# Patient Record
Sex: FEMALE | Race: BLACK OR AFRICAN AMERICAN | Employment: FULL TIME | URBAN - METROPOLITAN AREA
[De-identification: names, ages, dates, MRNs, and addresses within clinical notes are randomized per-mention and may not be internally consistent; named-entity substitution may affect disease eponyms.]

---

## 2018-09-23 ENCOUNTER — APPOINTMENT (OUTPATIENT)
Dept: GENERAL RADIOLOGY | Age: 43
End: 2018-09-23
Attending: EMERGENCY MEDICINE
Payer: SELF-PAY

## 2018-09-23 ENCOUNTER — HOSPITAL ENCOUNTER (EMERGENCY)
Age: 43
Discharge: HOME | End: 2018-09-23
Attending: EMERGENCY MEDICINE
Payer: SELF-PAY

## 2018-09-23 VITALS
WEIGHT: 213 LBS | OXYGEN SATURATION: 98 % | RESPIRATION RATE: 23 BRPM | HEART RATE: 80 BPM | BODY MASS INDEX: 35.45 KG/M2 | SYSTOLIC BLOOD PRESSURE: 125 MMHG | DIASTOLIC BLOOD PRESSURE: 69 MMHG | TEMPERATURE: 98.9 F

## 2018-09-23 DIAGNOSIS — R07.89 CHEST WALL PAIN: Primary | ICD-10-CM

## 2018-09-23 LAB
ANION GAP SERPL CALC-SCNC: 4 MMOL/L (ref 3–18)
BASOPHILS # BLD: 0 K/UL (ref 0–0.1)
BASOPHILS NFR BLD: 0 % (ref 0–2)
BUN SERPL-MCNC: 10 MG/DL (ref 7–18)
BUN/CREAT SERPL: 14 (ref 12–20)
CALCIUM SERPL-MCNC: 8.6 MG/DL (ref 8.5–10.1)
CHLORIDE SERPL-SCNC: 103 MMOL/L (ref 100–108)
CK MB CFR SERPL CALC: NORMAL % (ref 0–4)
CK MB SERPL-MCNC: <1 NG/ML (ref 5–25)
CK SERPL-CCNC: 92 U/L (ref 26–192)
CO2 SERPL-SCNC: 30 MMOL/L (ref 21–32)
CREAT SERPL-MCNC: 0.74 MG/DL (ref 0.6–1.3)
D DIMER PPP FEU-MCNC: 0.52 UG/ML(FEU)
DIFFERENTIAL METHOD BLD: ABNORMAL
EOSINOPHIL # BLD: 0.1 K/UL (ref 0–0.4)
EOSINOPHIL NFR BLD: 2 % (ref 0–5)
ERYTHROCYTE [DISTWIDTH] IN BLOOD BY AUTOMATED COUNT: 16.7 % (ref 11.6–14.5)
GLUCOSE SERPL-MCNC: 95 MG/DL (ref 74–99)
HCT VFR BLD AUTO: 36.3 % (ref 35–45)
HGB BLD-MCNC: 11.8 G/DL (ref 12–16)
LYMPHOCYTES # BLD: 2.8 K/UL (ref 0.9–3.6)
LYMPHOCYTES NFR BLD: 44 % (ref 21–52)
MCH RBC QN AUTO: 28.4 PG (ref 24–34)
MCHC RBC AUTO-ENTMCNC: 32.5 G/DL (ref 31–37)
MCV RBC AUTO: 87.3 FL (ref 74–97)
MONOCYTES # BLD: 0.7 K/UL (ref 0.05–1.2)
MONOCYTES NFR BLD: 11 % (ref 3–10)
NEUTS SEG # BLD: 2.8 K/UL (ref 1.8–8)
NEUTS SEG NFR BLD: 43 % (ref 40–73)
PLATELET # BLD AUTO: 175 K/UL (ref 135–420)
PMV BLD AUTO: 8.7 FL (ref 9.2–11.8)
POTASSIUM SERPL-SCNC: 4.2 MMOL/L (ref 3.5–5.5)
RBC # BLD AUTO: 4.16 M/UL (ref 4.2–5.3)
SODIUM SERPL-SCNC: 137 MMOL/L (ref 136–145)
TROPONIN I SERPL-MCNC: <0.02 NG/ML (ref 0–0.06)
WBC # BLD AUTO: 6.4 K/UL (ref 4.6–13.2)

## 2018-09-23 PROCEDURE — 71045 X-RAY EXAM CHEST 1 VIEW: CPT

## 2018-09-23 PROCEDURE — 85379 FIBRIN DEGRADATION QUANT: CPT | Performed by: EMERGENCY MEDICINE

## 2018-09-23 PROCEDURE — 99285 EMERGENCY DEPT VISIT HI MDM: CPT

## 2018-09-23 PROCEDURE — 94762 N-INVAS EAR/PLS OXIMTRY CONT: CPT

## 2018-09-23 PROCEDURE — 93005 ELECTROCARDIOGRAM TRACING: CPT

## 2018-09-23 PROCEDURE — 96374 THER/PROPH/DIAG INJ IV PUSH: CPT

## 2018-09-23 PROCEDURE — 80048 BASIC METABOLIC PNL TOTAL CA: CPT | Performed by: EMERGENCY MEDICINE

## 2018-09-23 PROCEDURE — 85025 COMPLETE CBC W/AUTO DIFF WBC: CPT | Performed by: EMERGENCY MEDICINE

## 2018-09-23 PROCEDURE — 74011250636 HC RX REV CODE- 250/636: Performed by: EMERGENCY MEDICINE

## 2018-09-23 PROCEDURE — 82550 ASSAY OF CK (CPK): CPT | Performed by: EMERGENCY MEDICINE

## 2018-09-23 RX ORDER — IBUPROFEN 600 MG/1
600 TABLET ORAL
Qty: 30 TAB | Refills: 0 | Status: SHIPPED | OUTPATIENT
Start: 2018-09-23 | End: 2019-06-12

## 2018-09-23 RX ORDER — KETOROLAC TROMETHAMINE 30 MG/ML
30 INJECTION, SOLUTION INTRAMUSCULAR; INTRAVENOUS
Status: COMPLETED | OUTPATIENT
Start: 2018-09-23 | End: 2018-09-23

## 2018-09-23 RX ADMIN — KETOROLAC TROMETHAMINE 30 MG: 30 INJECTION, SOLUTION INTRAMUSCULAR; INTRAVENOUS at 22:28

## 2018-09-23 NOTE — LETTER
WORK NOTIFICATION  
 
9/23/2018 10:40 PM 
 
Ms. Amrit Hedrick [address] To Whom It May Concern: 
 
Larissatripp Segal is currently under the care of [address] [facility] EMERGENCY DEPT. **No heavy lifting for 1 week** If there are questions or concerns please have the patient contact our office.

## 2018-09-24 LAB
ATRIAL RATE: 92 BPM
CALCULATED P AXIS, ECG09: 66 DEGREES
CALCULATED R AXIS, ECG10: 38 DEGREES
CALCULATED T AXIS, ECG11: 35 DEGREES
DIAGNOSIS, 93000: NORMAL
P-R INTERVAL, ECG05: 126 MS
Q-T INTERVAL, ECG07: 368 MS
QRS DURATION, ECG06: 76 MS
QTC CALCULATION (BEZET), ECG08: 455 MS
VENTRICULAR RATE, ECG03: 92 BPM

## 2018-09-24 NOTE — ED NOTES
Patient armband removed and shreddedI have reviewed discharge instructions with the patient. The patient verbalized understanding. rx x work note given

## 2018-09-24 NOTE — ED PROVIDER NOTES
EMERGENCY DEPARTMENT HISTORY AND PHYSICAL EXAM 
 
9:54 PM 
 
 
Date: 9/23/2018 Patient Name: Leia Cueto History of Presenting Illness Chief Complaint Patient presents with  Chest Pain History Provided By: Patient Chief Complaint: Chest pain Duration:  3 days Timing:  Acute Quality: Aching Severity: Moderate Modifying Factors: Worse when laying on right side Associated Symptoms: denies any other associated signs or symptoms Additional History (Context): Leia Cueto is a 37 y.o. female with past medical Hx of hypertension and asthma who presents with moderate, acute, aching chest pain with an onset of 3 days ago. Patient says that chest pain is worse when lying down. Denies any further complaints or symptoms at the moment. PCP: Roberta Price MD 
 
Current Outpatient Prescriptions Medication Sig Dispense Refill  ibuprofen (MOTRIN) 600 mg tablet Take 1 Tab by mouth every six (6) hours as needed for Pain. 30 Tab 0  
 losartan (COZAAR) 100 mg tablet Take 100 mg by mouth daily. Indications: HYPERTENSION Past History Past Medical History: 
Past Medical History:  
Diagnosis Date  Asthma  Hypertension Past Surgical History: 
Past Surgical History:  
Procedure Laterality Date  HX BREAST BIOPSY Right 5/2/2016 EXCISIONAL BIOPSY OF RIGHT BREAST MASS performed by Dee Dee Stahl MD at [facility] [employer] HX GYN    
 x2 csection  REMOVAL OF BREAST LESION Right 5-2-16 Dr. Irene Gomez Family History: 
Family History Problem Relation Age of Onset  Hypertension Mother  Diabetes Mother  Stroke Mother Social History: 
Social History Substance Use Topics  Smoking status: Current Every Day Smoker Packs/day: 1.00 Years: 10.00  Smokeless tobacco: Former User Quit date: 5/2/2016  Alcohol use No  
 
 
Allergies: 
No Known Allergies Review of Systems Review of Systems Constitutional: Negative for fever. HENT: Negative for congestion. Eyes: Negative for pain. Respiratory: Negative for shortness of breath. Cardiovascular: Positive for chest pain. Gastrointestinal: Negative for abdominal pain. Genitourinary: Negative for hematuria. Musculoskeletal: Negative for back pain. Skin: Negative for wound. Neurological: Negative for weakness. All other systems reviewed and are negative. Physical Exam  
 
Visit Vitals  /69  Pulse 80  Temp 98.9 °F (37.2 °C)  Resp 23  Wt 96.6 kg (213 lb)  SpO2 98%  BMI 35.45 kg/m2 Physical Exam  
Constitutional: She is oriented to person, place, and time. She appears well-developed and well-nourished. No distress. HENT:  
Head: Normocephalic. Right Ear: External ear normal.  
Left Ear: External ear normal.  
Mouth/Throat: No oropharyngeal exudate. Eyes: Conjunctivae and EOM are normal. Pupils are equal, round, and reactive to light. Right eye exhibits no discharge. Left eye exhibits no discharge. No scleral icterus. Neck: Normal range of motion. Neck supple. No JVD present. No tracheal deviation present. No thyromegaly present. Cardiovascular: Normal rate, regular rhythm, normal heart sounds and intact distal pulses. Exam reveals no gallop and no friction rub. No murmur heard. Pulmonary/Chest: Effort normal and breath sounds normal. No stridor. No respiratory distress. She has no wheezes. She has no rales. She exhibits no tenderness. Positive for left side chest wall tenderness Abdominal: Soft. Bowel sounds are normal. She exhibits no distension and no mass. There is no tenderness. There is no rebound and no guarding. Musculoskeletal: Normal range of motion. She exhibits no edema or tenderness. Lymphadenopathy:  
  She has no cervical adenopathy. Neurological: She is alert and oriented to person, place, and time.  She displays normal reflexes. No cranial nerve deficit. She exhibits normal muscle tone. Coordination normal.  
Skin: Skin is warm and dry. No rash noted. She is not diaphoretic. No erythema. No pallor. Nursing note and vitals reviewed. Diagnostic Study Results Labs - Recent Results (from the past 12 hour(s)) EKG, 12 LEAD, INITIAL Collection Time: 09/23/18  9:48 PM  
Result Value Ref Range Ventricular Rate 92 BPM  
 Atrial Rate 92 BPM  
 P-R Interval 126 ms  
 QRS Duration 76 ms  
 Q-T Interval 368 ms QTC Calculation (Bezet) 455 ms Calculated P Axis 66 degrees Calculated R Axis 38 degrees Calculated T Axis 35 degrees Diagnosis Normal sinus rhythm Normal ECG When compared with ECG of 29-APR-2016 12:35, No significant change was found CBC WITH AUTOMATED DIFF Collection Time: 09/23/18  9:54 PM  
Result Value Ref Range WBC 6.4 4.6 - 13.2 K/uL  
 RBC 4.16 (L) 4.20 - 5.30 M/uL  
 HGB 11.8 (L) 12.0 - 16.0 g/dL HCT 36.3 35.0 - 45.0 % MCV 87.3 74.0 - 97.0 FL  
 MCH 28.4 24.0 - 34.0 PG  
 MCHC 32.5 31.0 - 37.0 g/dL  
 RDW 16.7 (H) 11.6 - 14.5 % PLATELET 561 513 - 349 K/uL MPV 8.7 (L) 9.2 - 11.8 FL  
 NEUTROPHILS 43 40 - 73 % LYMPHOCYTES 44 21 - 52 % MONOCYTES 11 (H) 3 - 10 % EOSINOPHILS 2 0 - 5 % BASOPHILS 0 0 - 2 %  
 ABS. NEUTROPHILS 2.8 1.8 - 8.0 K/UL  
 ABS. LYMPHOCYTES 2.8 0.9 - 3.6 K/UL  
 ABS. MONOCYTES 0.7 0.05 - 1.2 K/UL  
 ABS. EOSINOPHILS 0.1 0.0 - 0.4 K/UL  
 ABS. BASOPHILS 0.0 0.0 - 0.1 K/UL  
 DF AUTOMATED METABOLIC PANEL, BASIC Collection Time: 09/23/18  9:54 PM  
Result Value Ref Range Sodium 137 136 - 145 mmol/L Potassium 4.2 3.5 - 5.5 mmol/L Chloride 103 100 - 108 mmol/L  
 CO2 30 21 - 32 mmol/L Anion gap 4 3.0 - 18 mmol/L Glucose 95 74 - 99 mg/dL BUN 10 7.0 - 18 MG/DL Creatinine 0.74 0.6 - 1.3 MG/DL  
 BUN/Creatinine ratio 14 12 - 20 GFR est AA >60 >60 ml/min/1.73m2 GFR est non-AA >60 >60 ml/min/1.73m2 Calcium 8.6 8.5 - 10.1 MG/DL  
CARDIAC PANEL,(CK, CKMB & TROPONIN) Collection Time: 09/23/18  9:54 PM  
Result Value Ref Range CK 92 26 - 192 U/L  
 CK - MB <1.0 <3.6 ng/ml CK-MB Index  0.0 - 4.0 % CALCULATION NOT PERFORMED WHEN RESULT IS BELOW LINEAR LIMIT Troponin-I, Qt. <0.02 0.00 - 0.06 NG/ML  
D DIMER Collection Time: 09/23/18  9:54 PM  
Result Value Ref Range D DIMER 0.52 (H) <0.46 ug/ml(FEU) Radiologic Studies -  
XR CHEST PORT    (Results Pending) Medical Decision Making I am the first provider for this patient. I reviewed the vital signs, available nursing notes, past medical history, past surgical history, family history and social history. Vital Signs-Reviewed the patient's vital signs. Pulse Oximetry Analysis -  100% on room air (Normal) EKG: Interpreted by the EP. Time Interpreted: 21:48 Rate: 92 bpm 
 Rhythm: Normal sinus rhythm Interpretation: No STEMI Records Reviewed: Nursing Notes (Time of Review: 9:54 PM) Provider Notes (Medical Decision Making): DDx: musculoskeletal chest wall pain, MI, pneumothorax, fractured rib, aneurysm Diagnosis Clinical Impression: 1. Chest wall pain Disposition: Discharge Follow-up Information Follow up With Details Comments Contact Info Nissa Dominguez MD Go in 3 days For Follow up [address] [facility] [address] 
[phone] [facility] EMERGENCY DEPT Go to As needed, If symptoms worsen [address] [address] [phone] Patient's Medications Start Taking IBUPROFEN (MOTRIN) 600 MG TABLET    Take 1 Tab by mouth every six (6) hours as needed for Pain. Continue Taking LOSARTAN (COZAAR) 100 MG TABLET    Take 100 mg by mouth daily. Indications: HYPERTENSION These Medications have changed No medications on file Stop Taking AMOXICILLIN (AMOXIL) 500 MG CAPSULE    Take 500 mg by mouth three (3) times daily. BUPROPION SR (WELLBUTRIN SR) 100 MG SR TABLET    Take  by mouth. Indications: SMOKING CESSATION  
 CIPROFLOXACIN HCL (CILOXAN) 0.3 % OPHTHALMIC SOLUTION    Apply 1 Drop to eye four (4) times daily. CYCLOBENZAPRINE (FLEXERIL) 10 MG TABLET    10 mg. HYDROCODONE-ACETAMINOPHEN (NORCO) 5-325 MG PER TABLET    1-2 tablets every 4 - 6 hours as needed for pain IBUPROFEN (MOTRIN) 600 MG TABLET    600 mg.  
 
_______________________________ Attestations: 
Scribe Attestation Tia Quiles acting as a scribe for and in the presence of Zaid Coto MD     
September 23, 2018 at 9:54 PM 
    
Provider Attestation:     
I personally performed the services described in the documentation, reviewed the documentation, as recorded by the scribe in my presence, and it accurately and completely records my words and actions. September 23, 2018 at 9:54 PM - Zaid Coto MD   
_______________________________

## 2018-09-24 NOTE — DISCHARGE INSTRUCTIONS
Musculoskeletal Chest Pain: Care Instructions  Your Care Instructions    Chest pain is not always a sign that something is wrong with your heart or that you have another serious problem. The doctor thinks your chest pain is caused by strained muscles or ligaments, inflamed chest cartilage, or another problem in your chest, rather than by your heart. You may need more tests to find the cause of your chest pain. Follow-up care is a key part of your treatment and safety. Be sure to make and go to all appointments, and call your doctor if you are having problems. It's also a good idea to know your test results and keep a list of the medicines you take. How can you care for yourself at home? · Take pain medicines exactly as directed. ¨ If the doctor gave you a prescription medicine for pain, take it as prescribed. ¨ If you are not taking a prescription pain medicine, ask your doctor if you can take an over-the-counter medicine. · Rest and protect the sore area. · Stop, change, or take a break from any activity that may be causing your pain or soreness. · Put ice or a cold pack on the sore area for 10 to 20 minutes at a time. Try to do this every 1 to 2 hours for the next 3 days (when you are awake) or until the swelling goes down. Put a thin cloth between the ice and your skin. · After 2 or 3 days, apply a heating pad set on low or a warm cloth to the area that hurts. Some doctors suggest that you go back and forth between hot and cold. · Do not wrap or tape your ribs for support. This may cause you to take smaller breaths, which could increase your risk of lung problems. · Mentholated creams such as Bengay or Icy Hot may soothe sore muscles. Follow the instructions on the package. · Follow your doctor's instructions for exercising. · Gentle stretching and massage may help you get better faster. Stretch slowly to the point just before pain begins, and hold the stretch for at least 15 to 30 seconds.  Do this 3 or 4 times a day. Stretch just after you have applied heat. · As your pain gets better, slowly return to your normal activities. Any increased pain may be a sign that you need to rest a while longer. When should you call for help? Call 911 anytime you think you may need emergency care. For example, call if:    · You have chest pain or pressure. This may occur with:  ¨ Sweating. ¨ Shortness of breath. ¨ Nausea or vomiting. ¨ Pain that spreads from the chest to the neck, jaw, or one or both shoulders or arms. ¨ Dizziness or lightheadedness. ¨ A fast or uneven pulse. After calling 911, chew 1 adult-strength aspirin. Wait for an ambulance. Do not try to drive yourself.     · You have sudden chest pain and shortness of breath, or you cough up blood.    Call your doctor now or seek immediate medical care if:    · You have any trouble breathing.     · Your chest pain gets worse.     · Your chest pain occurs consistently with exercise and is relieved by rest.    Watch closely for changes in your health, and be sure to contact your doctor if:    · Your chest pain does not get better after 1 week. Where can you learn more? Go to http://nigel-misha.info/. Enter V293 in the search box to learn more about \"Musculoskeletal Chest Pain: Care Instructions. \"  Current as of: November 20, 2017  Content Version: 11.7  © 2368-3922 [email or web address]. Care instructions adapted under license by [employer] (which disclaims liability or warranty for this information). If you have questions about a medical condition or this instruction, always ask your healthcare professional. [place] [address] any warranty or liability for your use of this information.

## 2019-02-16 ENCOUNTER — HOSPITAL ENCOUNTER (OUTPATIENT)
Dept: LAB | Age: 44
Discharge: HOME | End: 2019-02-16

## 2019-02-16 LAB — XX-LABCORP SPECIMEN COL,LCBCF: NORMAL

## 2019-02-16 PROCEDURE — 99001 SPECIMEN HANDLING PT-LAB: CPT

## 2019-02-28 ENCOUNTER — HOSPITAL ENCOUNTER (OUTPATIENT)
Dept: MAMMOGRAPHY | Age: 44
Discharge: HOME | End: 2019-02-28
Attending: NURSE PRACTITIONER
Payer: COMMERCIAL

## 2019-02-28 DIAGNOSIS — Z12.31 VISIT FOR SCREENING MAMMOGRAM: ICD-10-CM

## 2019-02-28 PROCEDURE — 77067 SCR MAMMO BI INCL CAD: CPT

## 2019-04-17 ENCOUNTER — HOSPITAL ENCOUNTER (OUTPATIENT)
Dept: CT IMAGING | Age: 44
Discharge: HOME | End: 2019-04-17
Attending: NURSE PRACTITIONER
Payer: COMMERCIAL

## 2019-04-17 DIAGNOSIS — R19.00 PELVIC MASS IN FEMALE: ICD-10-CM

## 2019-04-17 LAB — CREAT UR-MCNC: 0.6 MG/DL (ref 0.6–1.3)

## 2019-04-17 PROCEDURE — 82565 ASSAY OF CREATININE: CPT

## 2019-04-17 PROCEDURE — 74011636320 HC RX REV CODE- 636/320: Performed by: RADIOLOGY

## 2019-04-17 PROCEDURE — 74177 CT ABD & PELVIS W/CONTRAST: CPT

## 2019-04-17 RX ADMIN — IOPAMIDOL 100 ML: 612 INJECTION, SOLUTION INTRAVENOUS at 09:35

## 2019-06-11 ENCOUNTER — HOSPITAL ENCOUNTER (OUTPATIENT)
Dept: LAB | Age: 44
Discharge: HOME | End: 2019-06-11
Payer: COMMERCIAL

## 2019-06-11 DIAGNOSIS — Z01.818 OTHER SPECIFIED PRE-OPERATIVE EXAMINATION: ICD-10-CM

## 2019-06-11 LAB
ATRIAL RATE: 90 BPM
CALCULATED P AXIS, ECG09: 67 DEGREES
CALCULATED R AXIS, ECG10: 50 DEGREES
CALCULATED T AXIS, ECG11: 44 DEGREES
DIAGNOSIS, 93000: NORMAL
P-R INTERVAL, ECG05: 122 MS
Q-T INTERVAL, ECG07: 378 MS
QRS DURATION, ECG06: 84 MS
QTC CALCULATION (BEZET), ECG08: 462 MS
VENTRICULAR RATE, ECG03: 90 BPM

## 2019-06-11 PROCEDURE — 93005 ELECTROCARDIOGRAM TRACING: CPT

## 2019-06-12 ENCOUNTER — HOSPITAL ENCOUNTER (EMERGENCY)
Age: 44
Discharge: HOME | End: 2019-06-12
Attending: EMERGENCY MEDICINE
Payer: COMMERCIAL

## 2019-06-12 VITALS
TEMPERATURE: 98 F | SYSTOLIC BLOOD PRESSURE: 133 MMHG | OXYGEN SATURATION: 100 % | DIASTOLIC BLOOD PRESSURE: 87 MMHG | HEIGHT: 65 IN | WEIGHT: 210 LBS | RESPIRATION RATE: 16 BRPM | BODY MASS INDEX: 34.99 KG/M2 | HEART RATE: 88 BPM

## 2019-06-12 DIAGNOSIS — S05.02XA LEFT CORNEAL ABRASION, INITIAL ENCOUNTER: Primary | ICD-10-CM

## 2019-06-12 PROCEDURE — 99282 EMERGENCY DEPT VISIT SF MDM: CPT

## 2019-06-12 RX ORDER — IBUPROFEN 800 MG/1
800 TABLET ORAL EVERY 8 HOURS
Qty: 15 TAB | Refills: 0 | Status: SHIPPED | OUTPATIENT
Start: 2019-06-12 | End: 2019-06-18

## 2019-06-12 RX ORDER — DICLOFENAC SODIUM 16.05 MG/ML
1 SOLUTION TOPICAL
Qty: 1 BOTTLE | Refills: 0 | Status: SHIPPED | OUTPATIENT
Start: 2019-06-12 | End: 2019-06-18

## 2019-06-12 RX ORDER — PROPARACAINE HYDROCHLORIDE 5 MG/ML
1 SOLUTION/ DROPS OPHTHALMIC
Status: DISCONTINUED | OUTPATIENT
Start: 2019-06-12 | End: 2019-06-12 | Stop reason: HOSPADM

## 2019-06-12 RX ORDER — ERYTHROMYCIN 5 MG/G
OINTMENT OPHTHALMIC
Qty: 3.5 G | Refills: 0 | Status: SHIPPED | OUTPATIENT
Start: 2019-06-12 | End: 2019-06-18

## 2019-06-12 NOTE — ED PROVIDER NOTES
EMERGENCY DEPARTMENT HISTORY AND PHYSICAL EXAM    Date: 6/12/2019  Patient Name: Khushbu Man    History of Presenting Illness     Chief Complaint   Patient presents with    Eye Pain         History Provided By: Patient      Additional History (Context): Lori Santos is a 53 y.o. female with hypertension and asthma who presents with left eye foreign body sensation which is painful starting at 520 this morning upon awakening. She says her eye felt fine when she went to bed last night. Denies contact lens. Wears glasses. Is on her mental menstrual series now. PCP: Maria Eugenia Wilson NP    Current Facility-Administered Medications   Medication Dose Route Frequency Provider Last Rate Last Dose    fluorescein (FUL-CLARENCE) 1 mg ophthalmic strip 1 Strip  1 Strip Both Eyes NOW Sanjuana Bull PA        proparacaine (OPTHAINE) 0.5 % ophthalmic solution 1 Drop  1 Drop Both Eyes NOW Sanjuana Bull PA         Current Outpatient Medications   Medication Sig Dispense Refill    ibuprofen (MOTRIN) 800 mg tablet Take 1 Tab by mouth every eight (8) hours for 5 days. 15 Tab 0    erythromycin (ILOTYCIN) ophthalmic ointment Apply to affected eye(s) six (6) times a day for 7 days. 3.5 g 0    diclofenac sodium 1.5 % drop 1 Drop by Apply Externally route three (3) times daily as needed for Pain. 1 Bottle 0    losartan (COZAAR) 100 mg tablet Take 100 mg by mouth daily.  Indications: HYPERTENSION         Past History     Past Medical History:  Past Medical History:   Diagnosis Date    Asthma     Hypertension        Past Surgical History:  Past Surgical History:   Procedure Laterality Date    HX BREAST BIOPSY Right 5/2/2016    EXCISIONAL BIOPSY OF RIGHT BREAST MASS performed by Balta Alejo MD at [facility] MAIN OR    HX GYN      x2 csection     REMOVAL OF BREAST LESION Right 5-2-16    Dr. Cyn Roth       Family History:  Family History   Problem Relation Age of Onset    Hypertension Mother     Diabetes Mother     Stroke Mother        Social History:  Social History     Tobacco Use    Smoking status: Current Every Day Smoker     Packs/day: 1.00     Years: 10.00     Pack years: 10.00    Smokeless tobacco: Former User     Quit date: 5/2/2016   Substance Use Topics    Alcohol use: No    Drug use: No       Allergies:  No Known Allergies      Review of Systems   Review of Systems   Constitutional: Negative for fever. Eyes: Positive for pain. Negative for visual disturbance. All Other Systems Negative  Physical Exam     Vitals:    06/12/19 0551   BP: 133/87   Pulse: 88   Resp: 16   Temp: 98 °F (36.7 °C)   SpO2: 100%   Weight: 95.3 kg (210 lb)   Height: 5' 5\" (1.651 m)     Physical Exam   Constitutional: She is oriented to person, place, and time. She appears well-developed. She appears distressed. HENT:   Head: Normocephalic and atraumatic. Eyes: Pupils are equal, round, and reactive to light. Left eye: There is a horizontal corneal abrasion starting from the 9 o'clock position running to the midline of the clock over to almost a 3 o'clock position. Consistent with lichen ice wipe from of fingernail. Everted lid shows no foreign body. No hyphema lash margins clear. Neck: No JVD present. No tracheal deviation present. No thyromegaly present. Cardiovascular: Normal rate, regular rhythm and normal heart sounds. Exam reveals no gallop and no friction rub. No murmur heard. Pulmonary/Chest: Effort normal and breath sounds normal. No stridor. No respiratory distress. She has no wheezes. She has no rales. She exhibits no tenderness. Abdominal: Soft. She exhibits no distension and no mass. There is no tenderness. There is no rebound and no guarding. Musculoskeletal: She exhibits no edema or tenderness. Lymphadenopathy:     She has no cervical adenopathy. Neurological: She is alert and oriented to person, place, and time. Skin: Skin is warm and dry. No rash noted. No erythema. No pallor. Psychiatric: She has a normal mood and affect. Her behavior is normal. Thought content normal.   Nursing note and vitals reviewed. Diagnostic Study Results     Labs -   No results found for this or any previous visit (from the past 12 hour(s)). Radiologic Studies -   No orders to display     CT Results  (Last 48 hours)    None        CXR Results  (Last 48 hours)    None            Medical Decision Making   I am the first provider for this patient. I reviewed the vital signs, available nursing notes, past medical history, past surgical history, family history and social history. Vital Signs-Reviewed the patient's vital signs. Records Reviewed: Nursing Notes    Procedures:  Eye Stain    Date/Time: 6/12/2019 6:44 AM    Performed by: PA  Supervising provider: Sharon        Corneal abrasion was present on eyelid eversion. Left eye location: 9 o'clock    Cornea is clear. Anterior chamber is clear. Patient tolerance: Patient tolerated the procedure well with no immediate complications  My total time at bedside, performing this procedure was 1-15 minutes. Provider Notes (Medical Decision Making): Treat corneal abrasion. Acute onset with patient admitting that she sometimes wipes her eyes when she gets very tired. This mechanism seems to be consistent with her story that she was fine when she went to bed and then woke having inadvertently scratched her eye throughout the middle of the night. Will give diclofenac as well as ibuprofen and topical erythromycin for treatment plan. MED RECONCILIATION:  Current Facility-Administered Medications   Medication Dose Route Frequency    fluorescein (FUL-CLARENCE) 1 mg ophthalmic strip 1 Strip  1 Strip Both Eyes NOW    proparacaine (OPTHAINE) 0.5 % ophthalmic solution 1 Drop  1 Drop Both Eyes NOW     Current Outpatient Medications   Medication Sig    ibuprofen (MOTRIN) 800 mg tablet Take 1 Tab by mouth every eight (8) hours for 5 days.     erythromycin (ILOTYCIN) ophthalmic ointment Apply to affected eye(s) six (6) times a day for 7 days.  diclofenac sodium 1.5 % drop 1 Drop by Apply Externally route three (3) times daily as needed for Pain.  losartan (COZAAR) 100 mg tablet Take 100 mg by mouth daily. Indications: HYPERTENSION       Disposition:  home    DISCHARGE NOTE:   6:46 AM    Pt has been reexamined. Patient has no new complaints, changes, or physical findings. Care plan outlined and precautions discussed. Results of exam were reviewed with the patient. All medications were reviewed with the patient; will d/c home with see below. All of pt's questions and concerns were addressed. Patient was instructed and agrees to follow up with ophthalmology, as well as to return to the ED upon further deterioration. Patient is ready to go home. Follow-up Information     Follow up With Specialties Details Why Contact Info    [address]  Schedule an appointment as soon as possible for a visit in 3 days As needed [address] [facility]    Eliecer Munoz MD Ophthalmology Schedule an appointment as soon as possible for a visit in 2 days As needed [address] DEPT Emergency Medicine  If symptoms worsen return immediately [address]  [phone]          Current Discharge Medication List      START taking these medications    Details   erythromycin (ILOTYCIN) ophthalmic ointment Apply to affected eye(s) six (6) times a day for 7 days. Qty: 3.5 g, Refills: 0      diclofenac sodium 1.5 % drop 1 Drop by Apply Externally route three (3) times daily as needed for Pain. Qty: 1 Bottle, Refills: 0         CONTINUE these medications which have CHANGED    Details   ibuprofen (MOTRIN) 800 mg tablet Take 1 Tab by mouth every eight (8) hours for 5 days.   Qty: 15 Tab, Refills: 0             Diagnosis     Clinical Impression:   1.  Left corneal abrasion, initial encounter

## 2019-06-12 NOTE — DISCHARGE INSTRUCTIONS
Patient Education        Corneal Scratches: Care Instructions  Your Care Instructions    The cornea is the clear surface that covers the front of the eye. When a speck of dirt, a wood chip, an insect, or another object flies into your eye, it can cause a painful scratch on the cornea. Wearing contact lenses too long or rubbing your eyes can also scratch the cornea. Small scratches usually heal in a day or two. Deeper scratches may take longer. If you have had a foreign object removed from your eye or you have a corneal scratch, you will need to watch for infection and vision problems while your eye heals. Follow-up care is a key part of your treatment and safety. Be sure to make and go to all appointments, and call your doctor if you are having problems. It's also a good idea to know your test results and keep a list of the medicines you take. How can you care for yourself at home? · The doctor probably used a medicine during your exam to numb your eye. When it wears off in 30 to 60 minutes, your eye pain may come back. Take pain medicines exactly as directed. ? If the doctor gave you a prescription medicine for pain, take it as prescribed. ? If you are not taking a prescription pain medicine, ask your doctor if you can take an over-the-counter medicine. ? Do not take two or more pain medicines at the same time unless the doctor told you to. Many pain medicines have acetaminophen, which is Tylenol. Too much acetaminophen (Tylenol) can be harmful. · Do not rub your injured eye. Rubbing can make it worse. · Use the prescribed eyedrops or ointment as directed. Be sure the dropper or bottle tip is clean. To put in eyedrops or ointment:  ? Tilt your head back, and pull your lower eyelid down with one finger. ? Drop or squirt the medicine inside the lower lid. ? Close your eye for 30 to 60 seconds to let the drops or ointment move around.   ? Do not touch the ointment or dropper tip to your eyelashes or any other surface. · Do not use your contact lens in your hurt eye until your doctor says you can. Also, do not wear eye makeup until your eye has healed. · Do not drive if you have blurred vision. · Bright light may hurt. Sunglasses can help. · To prevent eye injuries in the future, wear safety glasses or goggles when you work with machines or tools, mow the lawn, or ride a bike or motorcycle. When should you call for help? Call your doctor now or seek immediate medical care if:    · You have signs of an eye infection, such as:  ? Pus or thick discharge coming from the eye.  ? Redness or swelling around the eye.  ? A fever.     · You have new or worse eye pain.     · You have vision changes.     · It feels like there is something in your eye.     · Light hurts your eye.    Watch closely for changes in your health, and be sure to contact your doctor if:    · You do not get better as expected. Where can you learn more? Go to http://nigel-misha.info/. Enter J045 in the search box to learn more about \"Corneal Scratches: Care Instructions. \"  Current as of: July 17, 2018  Content Version: 11.9  © 2876-7391 [employer]. Care instructions adapted under license by [employer] (which disclaims liability or warranty for this information). If you have questions about a medical condition or this instruction, always ask your healthcare professional. [place] [address] any warranty or liability for your use of this information.

## 2019-06-12 NOTE — ED TRIAGE NOTES
Patient states she woke up and her left eye was painful.   Pt states it feels like there is something in it

## 2019-06-26 PROBLEM — D21.9 FIBROIDS: Status: ACTIVE | Noted: 2019-06-26

## 2020-03-12 ENCOUNTER — HOSPITAL ENCOUNTER (EMERGENCY)
Age: 45
Discharge: HOME | End: 2020-03-12
Attending: EMERGENCY MEDICINE
Payer: COMMERCIAL

## 2020-03-12 VITALS
WEIGHT: 220 LBS | BODY MASS INDEX: 36.61 KG/M2 | DIASTOLIC BLOOD PRESSURE: 94 MMHG | RESPIRATION RATE: 18 BRPM | SYSTOLIC BLOOD PRESSURE: 146 MMHG | TEMPERATURE: 98.9 F | HEART RATE: 88 BPM | OXYGEN SATURATION: 99 %

## 2020-03-12 DIAGNOSIS — S16.1XXA STRAIN OF NECK MUSCLE, INITIAL ENCOUNTER: Primary | ICD-10-CM

## 2020-03-12 DIAGNOSIS — M62.838 CERVICAL PARASPINAL MUSCLE SPASM: ICD-10-CM
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
## 2020-03-12 PROCEDURE — 99283 EMERGENCY DEPT VISIT LOW MDM: CPT

## 2020-03-12 RX ORDER — CYCLOBENZAPRINE HCL 10 MG
10 TABLET ORAL
Qty: 15 TAB | Refills: 0 | Status: SHIPPED | OUTPATIENT
Start: 2020-03-12

## 2020-03-12 RX ORDER — IBUPROFEN 600 MG/1
600 TABLET ORAL
Qty: 20 TAB | Refills: 0 | Status: SHIPPED | OUTPATIENT
Start: 2020-03-12

## 2020-03-12 RX ORDER — ACETAMINOPHEN 325 MG/1
650 TABLET ORAL
Qty: 20 TAB | Refills: 0 | Status: SHIPPED | OUTPATIENT
Start: 2020-03-12

## 2020-03-12 NOTE — LETTER
[facility] EMERGENCY DEPT 
[address] [facility] [address] [phone] Work/School Note Date: 3/12/2020 To Whom It May concern: 
 
Yash Santana was seen and treated today in the emergency room by the following provider(s): 
Attending Provider: Javon Talavera MD 
Physician Assistant: Tamar Ganser, PA-C. Yash Santana may return to work on 3/15/20. Sincerely, Monika Castillo PA-C
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
## 2020-03-13 NOTE — DISCHARGE INSTRUCTIONS
Patient Education        Neck Strain or Sprain: Rehab Exercises  Introduction  Here are some examples of exercises for you to try. The exercises may be suggested for a condition or for rehabilitation. Start each exercise slowly. Ease off the exercises if you start to have pain. You will be told when to start these exercises and which ones will work best for you. How to do the exercises  Neck rotation   1. Sit in a firm chair, or stand up straight. 2. Keeping your chin level, turn your head to the right, and hold for 15 to 30 seconds. 3. Turn your head to the left and hold for 15 to 30 seconds. 4. Repeat 2 to 4 times to each side. Neck stretches   1. Look straight ahead, and tip your right ear to your right shoulder. Do not let your left shoulder rise up as you tip your head to the right. 2. Hold for 15 to 30 seconds. 3. Tilt your head to the left. Do not let your right shoulder rise up as you tip your head to the left. 4. Hold for 15 to 30 seconds. 5. Repeat 2 to 4 times to each side. Forward neck flexion   1. Sit in a firm chair, or stand up straight. 2. Bend your head forward. 3. Hold for 15 to 30 seconds. 4. Repeat 2 to 4 times. Lateral (side) bend strengthening   1. With your right hand, place your first two fingers on your right temple. 2. Start to bend your head to the side while using gentle pressure from your fingers to keep your head from bending. 3. Hold for about 6 seconds. 4. Repeat 8 to 12 times. 5. Switch hands and repeat the same exercise on your left side. Forward bend strengthening   1. Place your first two fingers of either hand on your forehead. 2. Start to bend your head forward while using gentle pressure from your fingers to keep your head from bending. 3. Hold for about 6 seconds. 4. Repeat 8 to 12 times. Neutral position strengthening   1. Using one hand, place your fingertips on the back of your head at the top of your neck.   2. Start to bend your head backward while using gentle pressure from your fingers to keep your head from bending. 3. Hold for about 6 seconds. 4. Repeat 8 to 12 times. Chin tuck   1. Lie on the floor with a rolled-up towel under your neck. Your head should be touching the floor. 2. Slowly bring your chin toward your chest.  3. Hold for a count of 6, and then relax for up to 10 seconds. 4. Repeat 8 to 12 times. Follow-up care is a key part of your treatment and safety. Be sure to make and go to all appointments, and call your doctor if you are having problems. It's also a good idea to know your test results and keep a list of the medicines you take. Where can you learn more? Go to http://nigel-misha.info/  Enter M679 in the search box to learn more about \"Neck Strain or Sprain: Rehab Exercises. \"  Current as of: June 26, 2019Content Version: 12.4  © 7147-1658 [employer]. Care instructions adapted under license by [employer] (which disclaims liability or warranty for this information). If you have questions about a medical condition or this instruction, always ask your healthcare professional. [place] [address] any warranty or liability for your use of this information. Patient Education        Neck Spasm: Exercises  Introduction  Here are some examples of exercises for you to try. The exercises may be suggested for a condition or for rehabilitation. Start each exercise slowly. Ease off the exercises if you start to have pain. You will be told when to start these exercises and which ones will work best for you. How to do the exercises  Levator scapula stretch   1. Sit in a firm chair, or stand up straight. 2. Gently tilt your head toward your left shoulder. 3. Turn your head to look down into your armpit, bending your head slightly forward. Let the weight of your head stretch your neck muscles. 4. Hold for 15 to 30 seconds.   5. Return to your starting position. 6. Follow the same instructions above, but tilt your head toward your right shoulder. 7. Repeat 2 to 4 times toward each shoulder. Upper trapezius stretch   1. Sit in a firm chair, or stand up straight. 2. This stretch works best if you keep your shoulder down as you lean away from it. To help you remember to do this, start by relaxing your shoulders and lightly holding on to your thighs or your chair. 3. Tilt your head toward your shoulder and hold for 15 to 30 seconds. Let the weight of your head stretch your muscles. 4. If you would like a little added stretch, place your arm behind your back. Use the arm opposite of the direction you are tilting your head. For example, if you are tilting your head to the left, place your right arm behind your back. 5. Repeat 2 to 4 times toward each shoulder. Neck rotation   1. Sit in a firm chair, or stand up straight. 2. Keeping your chin level, turn your head to the right, and hold for 15 to 30 seconds. 3. Turn your head to the left, and hold for 15 to 30 seconds. 4. Repeat 2 to 4 times to each side. Chin tuck   1. Lie on the floor with a rolled-up towel under your neck. Your head should be touching the floor. 2. Slowly bring your chin toward the front of your neck. 3. Hold for a count of 6, and then relax for up to 10 seconds. 4. Repeat 8 to 12 times. Forward neck flexion   1. Sit in a firm chair, or stand up straight. 2. Bend your head forward. 3. Hold for 15 to 30 seconds, then return to your starting position. 4. Repeat 2 to 4 times. Follow-up care is a key part of your treatment and safety. Be sure to make and go to all appointments, and call your doctor if you are having problems. It's also a good idea to know your test results and keep a list of the medicines you take. Where can you learn more? Go to http://nigel-misha.info/  Enter P962 in the search box to learn more about \"Neck Spasm: Exercises. \"  Current as of: June 26, 2019Content Version: 12.4  © 7475-1757 [employer]. Care instructions adapted under license by [employer] (which disclaims liability or warranty for this information). If you have questions about a medical condition or this instruction, always ask your healthcare professional. [address] any warranty or liability for your use of this information. Patient Education        Neck Strain: Care Instructions  Your Care Instructions    You have strained the muscles and ligaments in your neck. A sudden, awkward movement can strain the neck. This often occurs with falls or car accidents or during certain sports. Everyday activities like working on a computer or sleeping can also cause neck strain if they force you to hold your neck in an awkward position for a long time. It is common for neck pain to get worse for a day or two after an injury, but it should start to feel better after that. You may have more pain and stiffness for several days before it gets better. This is expected. It may take a few weeks or longer for it to heal completely. Good home treatment can help you get better faster and avoid future neck problems. Follow-up care is a key part of your treatment and safety. Be sure to make and go to all appointments, and call your doctor if you are having problems. It's also a good idea to know your test results and keep a list of the medicines you take. How can you care for yourself at home? · If you were given a neck brace (cervical collar) to limit neck motion, wear it as instructed for as many days as your doctor tells you to. Do not wear it longer than you were told to. Wearing a brace for too long can make neck stiffness worse and weaken the neck muscles. · You can try using heat or ice to see if it helps. ? Try using a heating pad on a low or medium setting for 15 to 20 minutes every 2 to 3 hours.  Try a warm shower in place of one session with the heating pad. You can also buy single-use heat wraps that last up to 8 hours. ? You can also try an ice pack for 10 to 15 minutes every 2 to 3 hours. · Take pain medicines exactly as directed. ? If the doctor gave you a prescription medicine for pain, take it as prescribed. ? If you are not taking a prescription pain medicine, ask your doctor if you can take an over-the-counter medicine. · Gently rub the area to relieve pain and help with blood flow. Do not massage the area if it hurts to do so. · Do not do anything that makes the pain worse. Take it easy for a couple of days. You can do your usual activities if they do not hurt your neck or put it at risk for more stress or injury. · Try sleeping on a special neck pillow. Place it under your neck, not under your head. Placing a tightly rolled-up towel under your neck while you sleep will also work. If you use a neck pillow or rolled towel, do not use your regular pillow at the same time. · To prevent future neck pain, do exercises to stretch and strengthen your neck and back. Learn how to use good posture, safe lifting techniques, and proper body mechanics. When should you call for help? Call 911 anytime you think you may need emergency care. For example, call if:    · You are unable to move an arm or a leg at all.   [facility] your doctor now or seek immediate medical care if:    · You have new or worse symptoms in your arms, legs, chest, belly, or buttocks. Symptoms may include:  ? Numbness or tingling. ? Weakness. ? Pain.     · You lose bladder or bowel control.    Watch closely for changes in your health, and be sure to contact your doctor if:    · You are not getting better as expected. Where can you learn more? Go to http://nigel-misha.info/  Enter M253 in the search box to learn more about \"Neck Strain: Care Instructions. \"  Current as of: June 26, 2019Content Version: 12.4  © 1459-3497 [employer]wise[employer]. Care instructions adapted under license by [employer] (which disclaims liability or warranty for this information). If you have questions about a medical condition or this instruction, always ask your healthcare professional. [address] any warranty or liability for your use of this information. Patient Education        Motor Vehicle Accident: Care Instructions  Your Care Instructions    You were seen by a doctor after a motor vehicle accident. Because of the accident, you may be sore for several days. Over the next few days, you may hurt more than you did just after the accident. The doctor has checked you carefully, but problems can develop later. If you notice any problems or new symptoms, get medical treatment right away. Follow-up care is a key part of your treatment and safety. Be sure to make and go to all appointments, and call your doctor if you are having problems. It's also a good idea to know your test results and keep a list of the medicines you take. How can you care for yourself at home? · Keep track of any new symptoms or changes in your symptoms. · Take it easy for the next few days, or longer if you are not feeling well. Do not try to do too much. · Put ice or a cold pack on any sore areas for 10 to 20 minutes at a time to stop swelling. Put a thin cloth between the ice pack and your skin. Do this several times a day for the first 2 days. · Be safe with medicines. Take pain medicines exactly as directed. ? If the doctor gave you a prescription medicine for pain, take it as prescribed. ? If you are not taking a prescription pain medicine, ask your doctor if you can take an over-the-counter medicine. · Do not drive after taking a prescription pain medicine. · Do not do anything that makes the pain worse. · Do not drink any alcohol for 24 hours or until your doctor tells you it is okay. When should you call for help?   Call [phone] if:    · You passed out (lost consciousness).    Call your doctor now or seek immediate medical care if:    · You have new or worse belly pain.     · You have new or worse trouble breathing.     · You have new or worse head pain.     · You have new pain, or your pain gets worse.     · You have new symptoms, such as numbness or vomiting.    Watch closely for changes in your health, and be sure to contact your doctor if:    · You are not getting better as expected. Where can you learn more? Go to http://nigel-misha.info/  Enter K905 in the search box to learn more about \"Motor Vehicle Accident: Care Instructions. \"  Current as of: June 26, 2019Content Version: 12.4  © 2949-9150 Healthwise, Incorporated. Care instructions adapted under license by [employer] (which disclaims liability or warranty for this information). If you have questions about a medical condition or this instruction, always ask your healthcare professional. [address] any warranty or liability for your use of this information.

## 2020-03-15 ENCOUNTER — HOSPITAL ENCOUNTER (EMERGENCY)
Age: 45
Discharge: LEFT BEFORE DISCHARGE | End: 2020-03-15
Attending: EMERGENCY MEDICINE
Payer: COMMERCIAL

## 2020-03-15 PROCEDURE — 75810000275 HC EMERGENCY DEPT VISIT NO LEVEL OF CARE

## 2020-10-17 ENCOUNTER — HOSPITAL ENCOUNTER (OUTPATIENT)
Dept: MAMMOGRAPHY | Age: 45
Discharge: HOME | End: 2020-10-17
Attending: OBSTETRICS & GYNECOLOGY
Payer: COMMERCIAL

## 2020-10-17 DIAGNOSIS — Z12.31 SCREENING MAMMOGRAM FOR HIGH-RISK PATIENT: ICD-10-CM

## 2020-10-17 PROCEDURE — 77063 BREAST TOMOSYNTHESIS BI: CPT

## 2021-03-12 ENCOUNTER — HOSPITAL ENCOUNTER (OUTPATIENT)
Dept: LAB | Age: 46
Discharge: HOME | End: 2021-03-12
Payer: COMMERCIAL

## 2021-03-12 LAB
25(OH)D3 SERPL-MCNC: 16.6 NG/ML (ref 30–100)
ALBUMIN SERPL-MCNC: 3.7 G/DL (ref 3.4–5)
ALBUMIN/GLOB SERPL: 1.1 {RATIO} (ref 0.8–1.7)
ALP SERPL-CCNC: 95 U/L (ref 45–117)
ALT SERPL-CCNC: 17 U/L (ref 13–56)
ANION GAP SERPL CALC-SCNC: 7 MMOL/L (ref 3–18)
AST SERPL-CCNC: 11 U/L (ref 10–38)
BASOPHILS # BLD: 0 K/UL (ref 0–0.1)
BASOPHILS NFR BLD: 1 % (ref 0–2)
BILIRUB SERPL-MCNC: 0.4 MG/DL (ref 0.2–1)
BUN SERPL-MCNC: 11 MG/DL (ref 7–18)
BUN/CREAT SERPL: 17 (ref 12–20)
CALCIUM SERPL-MCNC: 8.8 MG/DL (ref 8.5–10.1)
CHLORIDE SERPL-SCNC: 106 MMOL/L (ref 100–111)
CHOLEST SERPL-MCNC: 152 MG/DL
CO2 SERPL-SCNC: 29 MMOL/L (ref 21–32)
CREAT SERPL-MCNC: 0.65 MG/DL (ref 0.6–1.3)
DIFFERENTIAL METHOD BLD: ABNORMAL
EOSINOPHIL # BLD: 0 K/UL (ref 0–0.4)
EOSINOPHIL NFR BLD: 1 % (ref 0–5)
ERYTHROCYTE [DISTWIDTH] IN BLOOD BY AUTOMATED COUNT: 12.7 % (ref 11.6–14.5)
EST. AVERAGE GLUCOSE BLD GHB EST-MCNC: 120 MG/DL
GLOBULIN SER CALC-MCNC: 3.5 G/DL (ref 2–4)
GLUCOSE SERPL-MCNC: 92 MG/DL (ref 74–99)
HBA1C MFR BLD: 5.8 % (ref 4.2–5.6)
HCT VFR BLD AUTO: 40.1 % (ref 35–45)
HDLC SERPL-MCNC: 35 MG/DL (ref 40–60)
HDLC SERPL: 4.3 {RATIO} (ref 0–5)
HGB BLD-MCNC: 13.6 G/DL (ref 12–16)
LDLC SERPL CALC-MCNC: 104.2 MG/DL (ref 0–100)
LIPID PROFILE,FLP: ABNORMAL
LYMPHOCYTES # BLD: 2.1 K/UL (ref 0.9–3.6)
LYMPHOCYTES NFR BLD: 54 % (ref 21–52)
MCH RBC QN AUTO: 32.2 PG (ref 24–34)
MCHC RBC AUTO-ENTMCNC: 33.9 G/DL (ref 31–37)
MCV RBC AUTO: 95 FL (ref 74–97)
MONOCYTES # BLD: 0.4 K/UL (ref 0.05–1.2)
MONOCYTES NFR BLD: 10 % (ref 3–10)
NEUTS SEG # BLD: 1.3 K/UL (ref 1.8–8)
NEUTS SEG NFR BLD: 34 % (ref 40–73)
PLATELET # BLD AUTO: 174 K/UL (ref 135–420)
PMV BLD AUTO: 9 FL (ref 9.2–11.8)
POTASSIUM SERPL-SCNC: 4 MMOL/L (ref 3.5–5.5)
PROT SERPL-MCNC: 7.2 G/DL (ref 6.4–8.2)
RBC # BLD AUTO: 4.22 M/UL (ref 4.2–5.3)
SODIUM SERPL-SCNC: 142 MMOL/L (ref 136–145)
TRIGL SERPL-MCNC: 64 MG/DL (ref ?–150)
TSH SERPL DL<=0.05 MIU/L-ACNC: 0.55 UIU/ML (ref 0.36–3.74)
VLDLC SERPL CALC-MCNC: 12.8 MG/DL
WBC # BLD AUTO: 3.9 K/UL (ref 4.6–13.2)

## 2021-03-12 PROCEDURE — 36415 COLL VENOUS BLD VENIPUNCTURE: CPT

## 2021-03-12 PROCEDURE — 80061 LIPID PANEL: CPT

## 2021-03-12 PROCEDURE — 82306 VITAMIN D 25 HYDROXY: CPT

## 2021-03-12 PROCEDURE — 83036 HEMOGLOBIN GLYCOSYLATED A1C: CPT

## 2021-03-12 PROCEDURE — 80053 COMPREHEN METABOLIC PANEL: CPT

## 2021-03-12 PROCEDURE — 85025 COMPLETE CBC W/AUTO DIFF WBC: CPT

## 2021-03-12 PROCEDURE — 84443 ASSAY THYROID STIM HORMONE: CPT

## 2022-03-19 PROBLEM — D21.9 FIBROIDS: Status: ACTIVE | Noted: 2019-06-26

## 2022-05-27 ENCOUNTER — HOSPITAL ENCOUNTER (OUTPATIENT)
Dept: LAB | Age: 47
Discharge: HOME | End: 2022-05-27
Payer: COMMERCIAL

## 2022-05-27 LAB
25(OH)D3 SERPL-MCNC: 25.7 NG/ML (ref 30–100)
ALBUMIN SERPL-MCNC: 3.8 G/DL (ref 3.4–5)
ALBUMIN/GLOB SERPL: 1.2 {RATIO} (ref 0.8–1.7)
ALP SERPL-CCNC: 98 U/L (ref 45–117)
ALT SERPL-CCNC: 20 U/L (ref 13–56)
ANION GAP SERPL CALC-SCNC: 3 MMOL/L (ref 3–18)
AST SERPL-CCNC: 12 U/L (ref 10–38)
BASOPHILS # BLD: 0 K/UL (ref 0–0.1)
BASOPHILS NFR BLD: 0 % (ref 0–2)
BILIRUB SERPL-MCNC: 0.5 MG/DL (ref 0.2–1)
BUN SERPL-MCNC: 10 MG/DL (ref 7–18)
BUN/CREAT SERPL: 14 (ref 12–20)
CALCIUM SERPL-MCNC: 9.3 MG/DL (ref 8.5–10.1)
CHLORIDE SERPL-SCNC: 108 MMOL/L (ref 100–111)
CHOLEST SERPL-MCNC: 163 MG/DL
CO2 SERPL-SCNC: 31 MMOL/L (ref 21–32)
CREAT SERPL-MCNC: 0.69 MG/DL (ref 0.6–1.3)
DIFFERENTIAL METHOD BLD: ABNORMAL
EOSINOPHIL # BLD: 0 K/UL (ref 0–0.4)
EOSINOPHIL NFR BLD: 0 % (ref 0–5)
ERYTHROCYTE [DISTWIDTH] IN BLOOD BY AUTOMATED COUNT: 12.9 % (ref 11.6–14.5)
EST. AVERAGE GLUCOSE BLD GHB EST-MCNC: 134 MG/DL
GLOBULIN SER CALC-MCNC: 3.2 G/DL (ref 2–4)
GLUCOSE SERPL-MCNC: 95 MG/DL (ref 74–99)
HBA1C MFR BLD: 6.3 % (ref 4.2–5.6)
HCT VFR BLD AUTO: 40.3 % (ref 35–45)
HDLC SERPL-MCNC: 33 MG/DL (ref 40–60)
HDLC SERPL: 4.9 {RATIO} (ref 0–5)
HGB BLD-MCNC: 13.4 G/DL (ref 12–16)
IMM GRANULOCYTES # BLD AUTO: 0 K/UL (ref 0–0.04)
IMM GRANULOCYTES NFR BLD AUTO: 0 % (ref 0–0.5)
LDLC SERPL CALC-MCNC: 102.8 MG/DL (ref 0–100)
LIPID PROFILE,FLP: ABNORMAL
LYMPHOCYTES # BLD: 2 K/UL (ref 0.9–3.6)
LYMPHOCYTES NFR BLD: 44 % (ref 21–52)
MCH RBC QN AUTO: 31.9 PG (ref 24–34)
MCHC RBC AUTO-ENTMCNC: 33.3 G/DL (ref 31–37)
MCV RBC AUTO: 96 FL (ref 78–100)
MONOCYTES # BLD: 0.5 K/UL (ref 0.05–1.2)
MONOCYTES NFR BLD: 11 % (ref 3–10)
NEUTS SEG # BLD: 2 K/UL (ref 1.8–8)
NEUTS SEG NFR BLD: 44 % (ref 40–73)
NRBC # BLD: 0 K/UL (ref 0–0.01)
NRBC BLD-RTO: 0 PER 100 WBC
PLATELET # BLD AUTO: 168 K/UL (ref 135–420)
PMV BLD AUTO: 8.9 FL (ref 9.2–11.8)
POTASSIUM SERPL-SCNC: 3.9 MMOL/L (ref 3.5–5.5)
PROT SERPL-MCNC: 7 G/DL (ref 6.4–8.2)
RBC # BLD AUTO: 4.2 M/UL (ref 4.2–5.3)
SODIUM SERPL-SCNC: 142 MMOL/L (ref 136–145)
TRIGL SERPL-MCNC: 136 MG/DL (ref ?–150)
VLDLC SERPL CALC-MCNC: 27.2 MG/DL
WBC # BLD AUTO: 4.5 K/UL (ref 4.6–13.2)

## 2022-05-27 PROCEDURE — 82306 VITAMIN D 25 HYDROXY: CPT

## 2022-05-27 PROCEDURE — 80053 COMPREHEN METABOLIC PANEL: CPT

## 2022-05-27 PROCEDURE — 85025 COMPLETE CBC W/AUTO DIFF WBC: CPT

## 2022-05-27 PROCEDURE — 36415 COLL VENOUS BLD VENIPUNCTURE: CPT

## 2022-05-27 PROCEDURE — 83036 HEMOGLOBIN GLYCOSYLATED A1C: CPT

## 2022-05-27 PROCEDURE — 80061 LIPID PANEL: CPT

## 2023-03-30 ENCOUNTER — HOSPITAL ENCOUNTER (OUTPATIENT)
Facility: HOSPITAL | Age: 48
Discharge: HOME | End: 2023-04-02

## 2023-03-30 LAB — LABCORP SPECIMEN COLLECTION: NORMAL

## 2023-03-30 PROCEDURE — 99001 SPECIMEN HANDLING PT-LAB: CPT

## 2023-04-27 ENCOUNTER — HOSPITAL ENCOUNTER (OUTPATIENT)
Facility: HOSPITAL | Age: 48
Discharge: HOME | End: 2023-04-30

## 2023-04-27 ENCOUNTER — HOSPITAL ENCOUNTER (OUTPATIENT)
Facility: HOSPITAL | Age: 48
Discharge: HOME | End: 2023-04-27
Payer: COMMERCIAL

## 2023-04-27 DIAGNOSIS — R00.2 PALPITATIONS: ICD-10-CM

## 2023-04-27 LAB — LABCORP SPECIMEN COLLECTION: NORMAL

## 2023-04-27 PROCEDURE — 99001 SPECIMEN HANDLING PT-LAB: CPT

## 2023-04-27 PROCEDURE — 76536 US EXAM OF HEAD AND NECK: CPT

## 2023-05-26 ENCOUNTER — TRANSCRIBE ORDERS (OUTPATIENT)
Facility: HOSPITAL | Age: 48
End: 2023-05-26

## 2023-05-26 DIAGNOSIS — E04.2 NONTOXIC MULTINODULAR GOITER: Primary | ICD-10-CM

## 2023-06-19 ENCOUNTER — HOSPITAL ENCOUNTER (OUTPATIENT)
Facility: HOSPITAL | Age: 48
Discharge: HOME | End: 2023-06-22
Payer: COMMERCIAL

## 2023-06-19 DIAGNOSIS — S34.109D INJURY OF LUMBAR SPINE, SUBSEQUENT ENCOUNTER (HCC): ICD-10-CM

## 2023-06-19 PROCEDURE — 72114 X-RAY EXAM L-S SPINE BENDING: CPT

## 2023-07-18 ENCOUNTER — OFFICE VISIT (OUTPATIENT)
Age: 48
End: 2023-07-18
Payer: COMMERCIAL

## 2023-07-18 VITALS — WEIGHT: 211 LBS | TEMPERATURE: 98.2 F | BODY MASS INDEX: 35.16 KG/M2 | HEIGHT: 65 IN

## 2023-07-18 DIAGNOSIS — G89.29 CHRONIC BILATERAL LOW BACK PAIN WITHOUT SCIATICA: Primary | ICD-10-CM

## 2023-07-18 DIAGNOSIS — M54.50 CHRONIC BILATERAL LOW BACK PAIN WITHOUT SCIATICA: Primary | ICD-10-CM

## 2023-07-18 PROCEDURE — 99204 OFFICE O/P NEW MOD 45 MIN: CPT | Performed by: PHYSICAL MEDICINE & REHABILITATION

## 2023-07-18 RX ORDER — ROSUVASTATIN CALCIUM 5 MG/1
TABLET, COATED ORAL
COMMUNITY

## 2023-07-18 RX ORDER — ERGOCALCIFEROL 1.25 MG/1
CAPSULE ORAL
COMMUNITY

## 2023-07-18 RX ORDER — AMLODIPINE BESYLATE 5 MG/1
TABLET ORAL
COMMUNITY

## 2023-07-18 RX ORDER — FLUTICASONE PROPIONATE 50 MCG
1 BLISTER, WITH INHALATION DEVICE INHALATION
COMMUNITY

## 2023-07-18 RX ORDER — ALBUTEROL SULFATE 90 UG/1
2 INHALANT RESPIRATORY (INHALATION)
COMMUNITY

## 2023-07-18 RX ORDER — METHOCARBAMOL 500 MG/1
TABLET, FILM COATED ORAL
COMMUNITY
Start: 2023-06-19

## 2023-07-18 ASSESSMENT — PATIENT HEALTH QUESTIONNAIRE - PHQ9
SUM OF ALL RESPONSES TO PHQ QUESTIONS 1-9: 0
1. LITTLE INTEREST OR PLEASURE IN DOING THINGS: 0
2. FEELING DOWN, DEPRESSED OR HOPELESS: 0
SUM OF ALL RESPONSES TO PHQ QUESTIONS 1-9: 0
SUM OF ALL RESPONSES TO PHQ9 QUESTIONS 1 & 2: 0

## 2023-07-18 NOTE — PROGRESS NOTES
[facility]  [address] [facility] [address], [address] [place] [address]   Phone: [phone]  Fax: [phone]      Patient: Tim Cronin                                                                              MRN: [id number]        YOB: 1975          AGE: 46 y.o. PCP: ERICA Patel NP  Date:  07/18/23    Reason for Consultation: Back Pain      HPI:  Tim Cronin is a 46 y.o. female with relevant PMH of HTN who presents with mid and low back pain which began after an while lifting at work 6/17/23. The following day felt back pain and saw her PCP 6/19/23 who got x-rays of her spine and had her rest from work and gave her muscle relaxants and NSAIDS. She returned to work 6/26/23 but pain persisted and she saw her pcp and was held out of work from 7/22/23. Works at [employer]    Neurologic symptoms: No numbness, tingling, weakness, bowel or bladder changes. No recent falls      Location: The pain is located in the low back pain    Radiation: The pain does not radiate . Pain Score: Currently: 8/10    Quality: Pain is of a sharp, cramping, spasm quality. Aggravating: Pain is exacerbated by walking and standing  Alleviating: The pain is alleviated by lying down    Prior Treatments:  Physical therapy: No  Injections:No  Surgery:No  Previous Medications: flexeril 10mg , robaxin,  Current Medications: ibuprofen 600mg   Previous work-up has included:   X-ray lumbar spine 6/19/2023-  DEGENERATIVE CHANGES:  Moderate degenerative changes dominant posterior  elements of the middle and lower lumbar spine.       Past Medical History:   Past Medical History:   Diagnosis Date    Asthma     Hypertension     Pre-diabetes       Past Surgical History:   Past Surgical History:   Procedure Laterality Date    BREAST BIOPSY Right 5/2/2016    EXCISIONAL BIOPSY OF RIGHT BREAST MASS performed by Ernesto Lamb MD at [address] [place] [address]

## 2023-07-18 NOTE — PROGRESS NOTES
Jennifer Felix presents today for   Chief Complaint   Patient presents with    Back Pain       Is someone accompanying this pt? no    Is the patient using any DME equipment during OV? no    Depression Screening:  No flowsheet data found. Learning Assessment:  No flowsheet data found. Abuse Screening:  No flowsheet data found. Fall Risk  No flowsheet data found. OPIOID RISK TOOL  No flowsheet data found. Coordination of Care:  1. Have you been to the ER, urgent care clinic since your last visit? no  Hospitalized since your last visit? no    2. Have you seen or consulted any other health care providers outside of the [address] [place] [address] since your last visit? no Include any pap smears or colon screening.  no

## 2023-07-20 ENCOUNTER — HOSPITAL ENCOUNTER (OUTPATIENT)
Facility: HOSPITAL | Age: 48
Discharge: HOME | End: 2023-07-20
Payer: COMMERCIAL

## 2023-07-20 DIAGNOSIS — E04.2 NONTOXIC MULTINODULAR GOITER: ICD-10-CM

## 2023-07-20 PROCEDURE — 88172 CYTP DX EVAL FNA 1ST EA SITE: CPT

## 2023-07-20 PROCEDURE — 88177 CYTP FNA EVAL EA ADDL: CPT

## 2023-07-20 PROCEDURE — 88173 CYTOPATH EVAL FNA REPORT: CPT

## 2023-07-20 PROCEDURE — 10006 FNA BX W/US GDN EA ADDL: CPT

## 2023-08-23 ENCOUNTER — APPOINTMENT (OUTPATIENT)
Facility: HOSPITAL | Age: 48
End: 2023-08-23
Payer: COMMERCIAL

## 2023-08-29 ENCOUNTER — HOSPITAL ENCOUNTER (OUTPATIENT)
Facility: HOSPITAL | Age: 48
Setting detail: RECURRING SERIES
Discharge: HOME | End: 2023-09-01
Payer: COMMERCIAL

## 2023-08-29 PROCEDURE — 97162 PT EVAL MOD COMPLEX 30 MIN: CPT

## 2023-08-29 PROCEDURE — 97140 MANUAL THERAPY 1/> REGIONS: CPT

## 2023-08-29 NOTE — PROGRESS NOTES
[facility] - [address] [place], [address] [facility]  [phone] fax    Plan of Care / Statement of Necessity for Physical Therapy Services     Patient Name: Sindhu Madera : 1975   Medical   Diagnosis: Other low back pain [M54.59] Treatment Diagnosis: M54.59  OTHER LOWER BACK PAIN      Onset Date: 23 Payor :  Payor: GENERIC MCO / Plan: Yoon Vora MCO WC / Product Type: *No Product type* /    Referral Source: Jyotidontae Bishopchris Start of Care ([facility]): 2023   Prior Hospitalization: See medical history Provider #: [id number]   Prior Level of Function: Functionally (I) working 50-60 hr weeks on her feet including lifting   Comorbidities:  HTN, pre DM, high cholesterol, OA     Assessment / key information:    Ms. Allyssa Humphreys is a 52yo female who presents to PT with signs and symptoms consistent with lumbar sprain/strain. Pt may have some dural tension as noted by positive slump, but all myotomes, dermatomes, and reflexes were WNL. Pt demonstrate lumbopelvic dysfunction with left posterior rotation that was corrected with MET and pelvic shot gun. Pt demonstrated decreased bilateral hip strength, core strength, and impaired flexibility. Pt deficits impair her ability to tolerate prolonged standing, sitting, and work duties at [employer]. Pt will benefit from skilled PT in order to address listed deficits, decrease pain, and maximize functional potential.       Evaluation Complexity:  History:  MEDIUM  Complexity : 1-2 comorbidities / personal factors will impact the outcome/ POC ; Examination:  MEDIUM Complexity : 3 Standardized tests and measures addressin body structure, function, activity limitation and / or participation in recreation  ;Presentation:  MEDIUM Complexity : Evolving with changing characteristics  ; Clinical Decision Making:  MEDIUM Complexity : FOTO score of 26-74 FOTO score = an established functional score where 100 = no disability  Overall
abduction/extension strength 5-/5 in order to increased ambulation distances in the community   Eval = 4-/5 into both planes bilaterally   3. Pt will report max pain 3/10 in order to increase participation in therapy   Eval = 10/10    Next PN/ RC due 9/27/23    PLAN  Yes  Continue plan of care  []  Upgrade activities as tolerated  []  Discharge due to :  []  Other:    Rhona Cardenas, PT    8/29/2023    3:43 PM    No future appointments.

## 2023-08-31 ENCOUNTER — TELEPHONE (OUTPATIENT)
Age: 48
End: 2023-08-31

## 2023-08-31 ENCOUNTER — HOSPITAL ENCOUNTER (OUTPATIENT)
Facility: HOSPITAL | Age: 48
Setting detail: RECURRING SERIES
End: 2023-08-31
Payer: COMMERCIAL

## 2023-08-31 PROCEDURE — 97110 THERAPEUTIC EXERCISES: CPT

## 2023-08-31 PROCEDURE — 97530 THERAPEUTIC ACTIVITIES: CPT

## 2023-08-31 PROCEDURE — G0283 ELEC STIM OTHER THAN WOUND: HCPCS

## 2023-08-31 PROCEDURE — 97535 SELF CARE MNGMENT TRAINING: CPT

## 2023-08-31 PROCEDURE — 97112 NEUROMUSCULAR REEDUCATION: CPT

## 2023-08-31 NOTE — PROGRESS NOTES
PHYSICAL / OCCUPATIONAL THERAPY - DAILY TREATMENT NOTE (updated )    Patient Name: Donna Carmona    Date: 2023    : 1975  Insurance: Payor: GENERIC [facility] / Plan: Karol Griffiths [facility] WC / Product Type: *No Product type* /      Patient  verified Yes     Visit #   Current / Total 2 10   Time   In / Out 1050 1142   Pain   In / Out 7 2-3   Subjective Functional Status/Changes: \"I'm hurting in my mid-back. I took some pain meds. \"     TREATMENT AREA =  Other low back pain [M54.59]    OBJECTIVE    Modalities Rationale:     decrease pain and increase tissue extensibility to improve patient's ability to progress to PLOF and address remaining functional goals. 10 min [x] Estim Unattended, type/location: IFC/lower back                                     []  w/ice    [x]  w/heat    min [] Estim Attended, type/location:                                     []  w/US     []  w/ice    []  w/heat    []  TENS insruct      min []  Mechanical Traction: type/lbs                   []  pro   []  sup   []  int   []  cont    []  before manual    []  after manual    min []  Ultrasound, settings/location:      min  unbill []  Ice     []  Heat    location/position:     min []  Paraffin,  details:     min []  Vasopneumatic Device, press/temp:     min []  Wanda Paget / Gloria Serge: If using vaso (only need to measure limb vaso being performed on)      pre-treatment girth :       post-treatment girth :       measured at (landmark location) :      min []  Other:    Skin assessment post-treatment:  Intact      Therapeutic Procedures: Tx Min Billable or 1:1 Min (if diff from Tx Min) Procedure, Rationale, Specifics   10  37875 Therapeutic Exercise (timed):  increase ROM, strength, coordination, balance, and proprioception to improve patient's ability to progress to PLOF and address remaining functional goals.  (see flow sheet as applicable)     Details if applicable:       14  19241 Neuromuscular Re-Education (timed):  improve balance,

## 2023-08-31 NOTE — TELEPHONE ENCOUNTER
Patient is asking for a work note stating she needs to remain out of work for another week.      Patient can be reached at [phone]

## 2023-09-01 ENCOUNTER — TELEMEDICINE (OUTPATIENT)
Age: 48
End: 2023-09-01

## 2023-09-01 ENCOUNTER — TELEPHONE (OUTPATIENT)
Age: 48
End: 2023-09-01

## 2023-09-01 DIAGNOSIS — M54.50 CHRONIC BILATERAL LOW BACK PAIN WITHOUT SCIATICA: Primary | ICD-10-CM

## 2023-09-01 DIAGNOSIS — G89.29 CHRONIC BILATERAL LOW BACK PAIN WITHOUT SCIATICA: Primary | ICD-10-CM

## 2023-09-01 NOTE — PROGRESS NOTES
[facility]  [address] [facility] [address], [address] [place] [address]   Phone: [phone]  Fax: [phone]      Patient: Gial Miranda                                                                              MRN: [id number]        YOB: 1975          AGE: 46 y.o. PCP: ERICA Evangelista NP  Date:  09/01/23    Reason for Consultation: No chief complaint on file. HPI:  Gail Miranda is a 46 y.o. female with relevant PMH of HTN who presented with mid and low back pain which began after an while lifting at work 6/17/23. The following day felt back pain and saw her PCP 6/19/23 who got x-rays of her spine and had her rest from work and gave her muscle relaxants and NSAIDS. She returned to work 6/26/23 but pain persisted and she saw her pcp and was held out of work from 7/22/23. She is feeling a bit better and just started PT 8/31/23. Works at [employer]    Neurologic symptoms: No numbness, tingling, weakness, bowel or bladder changes. No recent falls      Location: The pain is located in the low back pain    Radiation: The pain does not radiate . Pain Score: Currently: 8/10    Quality: Pain is of a sharp, cramping, spasm quality. Aggravating: Pain is exacerbated by walking and standing  Alleviating: The pain is alleviated by lying down    Prior Treatments:  Physical therapy: No  Injections:No  Surgery:No  Previous Medications: flexeril 10mg , robaxin,  Current Medications: ibuprofen 600mg   Previous work-up has included:   X-ray lumbar spine 6/19/2023-  DEGENERATIVE CHANGES:  Moderate degenerative changes dominant posterior  elements of the middle and lower lumbar spine.       Past Medical History:   Past Medical History:   Diagnosis Date    Asthma     Hypertension     Pre-diabetes       Past Surgical History:   Past Surgical History:   Procedure Laterality Date    BREAST BIOPSY Right 5/2/2016    EXCISIONAL BIOPSY OF

## 2023-09-01 NOTE — TELEPHONE ENCOUNTER
Patient called and said that she is supposed to return to work today. Patient said that her Supervisor is asking she bring a Return to Work Note. Patient states that the 7/18/23 work note , that states she should remain out of work until 9/1/23 is not enough. Her employer is asking for a New Note that would state she is able to return to work ,with or without restrictions. Patient  said that she was evaluated for Physical Therapy on 8/29/23, and her first appt with them was yesterday . That she still has several weeks of Physical Therapy. Patient said that she is aware she needs to schedule a VV appt for the work note, that she has scheduled the VV appt, but it is not until 9/7/23 with Joshua Flower. Patient is asking if she could get a note today, due to she has to return to work today. Patient tel. [phone]. Note : please see previous messages.

## 2023-09-01 NOTE — TELEPHONE ENCOUNTER
Patient called to ask if she should remain out of work until she completes all of her physical therapy. Please advise. Patient can be reached at [phone].

## 2023-09-01 NOTE — TELEPHONE ENCOUNTER
Patient is scheduled for 10/2/23 and was wanting to know if she would need another doctors until then

## 2023-09-01 NOTE — TELEPHONE ENCOUNTER
I called and spoke to MsAurelio Sree. The pt was identified using 2 pt identifiers. I explained to the pt that she will need to have her appt in order to be re-evaluated for another out of work letter. If her letter is dated for return on 09/01/23 then this would be the day that she can return to work. She verbalized understanding and has no questions or concerns at this time. She is scheduled for a telephone visit on 09/07/23 at 1630.

## 2023-09-01 NOTE — TELEPHONE ENCOUNTER
Patient came into the office to  her new note and there was no new note for her in the chart. I did set her up a VV with Pennelope Burn to get a new note and the patient also wanted to know if she needed to go back to work today since today was the day she needed to go back to work.  Please advise [phone]

## 2023-09-01 NOTE — TELEPHONE ENCOUNTER
Apple Tracy MD  You 3 hours ago (11:09 AM)       Sorry I just saw your message I can do it today after 3 pm if she would like.   Just text me if she takes the appointment and I will know to call her or log on

## 2023-09-01 NOTE — TELEPHONE ENCOUNTER
----- Message from Lauri Denver, MD sent at 9/1/2023  3:37 PM EDT -----  Can you move her appointment from 9/6/23 to a later date in September or early October  thanks

## 2023-09-01 NOTE — TELEPHONE ENCOUNTER
Pt was contacted and offered a telephone visit this afternoon with Dr. Rachael Goldman. The pt accepted the telephone visit for [phone]. Dr. Rachael Goldman made aware.

## 2023-09-06 ENCOUNTER — HOSPITAL ENCOUNTER (OUTPATIENT)
Facility: HOSPITAL | Age: 48
Setting detail: RECURRING SERIES
Discharge: HOME | End: 2023-09-09
Payer: COMMERCIAL

## 2023-09-06 PROCEDURE — 97530 THERAPEUTIC ACTIVITIES: CPT

## 2023-09-06 PROCEDURE — 97112 NEUROMUSCULAR REEDUCATION: CPT

## 2023-09-06 PROCEDURE — 97110 THERAPEUTIC EXERCISES: CPT

## 2023-09-06 PROCEDURE — 97535 SELF CARE MNGMENT TRAINING: CPT

## 2023-09-06 NOTE — PROGRESS NOTES
PHYSICAL / OCCUPATIONAL THERAPY - DAILY TREATMENT NOTE (updated )    Patient Name: Bernadene Harada    Date: 2023    : 1975  Insurance: Payor: GENERIC MCO / Plan: Minda RAMOS WC / Product Type: *No Product type* /      Patient  verified Yes     Visit #   Current / Total 3 10   Time   In / Out 1130 1212   Pain   In / Out 2 4   Subjective Functional Status/Changes: \"I don't have much pain right now. I was feeling it the other day. \"     TREATMENT AREA =  Other low back pain [M54.59]    OBJECTIVE         Therapeutic Procedures: Tx Min Billable or 1:1 Min (if diff from Tx Min) Procedure, Rationale, Specifics   10  26270 Therapeutic Exercise (timed):  increase ROM, strength, coordination, balance, and proprioception to improve patient's ability to progress to PLOF and address remaining functional goals. (see flow sheet as applicable)     Details if applicable:       12  10201 Neuromuscular Re-Education (timed):  improve balance, coordination, kinesthetic sense, posture, core stability and proprioception to improve patient's ability to develop conscious control of individual muscles and awareness of position of extremities in order to progress to PLOF and address remaining functional goals. (see flow sheet as applicable)     Details if applicable:     10  92185 Therapeutic Activity (timed):  use of dynamic activities replicating functional movements to increase ROM, strength, coordination, balance, and proprioception in order to improve patient's ability to progress to PLOF and address remaining functional goals.   (see flow sheet as applicable)     Details if applicable:     10  91741 Self Care/Home Management (timed):  improve patient knowledge and understanding of pain reducing techniques, positioning, posture/ergonomics, home safety, activity modification, diagnosis/prognosis, and physical therapy expectations, procedures and progression  to improve patient's ability to progress to PLOF and address

## 2023-09-08 ENCOUNTER — HOSPITAL ENCOUNTER (OUTPATIENT)
Facility: HOSPITAL | Age: 48
Setting detail: RECURRING SERIES
Discharge: HOME | End: 2023-09-11
Payer: COMMERCIAL

## 2023-09-08 PROCEDURE — 97530 THERAPEUTIC ACTIVITIES: CPT

## 2023-09-08 PROCEDURE — 97110 THERAPEUTIC EXERCISES: CPT

## 2023-09-08 PROCEDURE — 97535 SELF CARE MNGMENT TRAINING: CPT

## 2023-09-08 PROCEDURE — 97112 NEUROMUSCULAR REEDUCATION: CPT

## 2023-09-08 NOTE — PROGRESS NOTES
address remaining functional goals. (see flow sheet as applicable)     Details if applicable:  Pt educaiton on proepr squat & Deadlift position, education on rational of exercises   36 39 MC BC Totals Reminder: bill using total billable min of TIMED therapeutic procedures (example: do not include dry needle or estim unattended, both untimed codes, in totals to left)  8-22 min = 1 unit; 23-37 min = 2 units; 38-52 min = 3 units; 53-67 min = 4 units; 68-82 min = 5 units   Total Total     [x]  Patient Education billed concurrently with other procedures   [x] Review HEP    [] Progressed/Changed HEP, detail:    [] Other detail:       Objective Information/Functional Measures/Assessment    Tolerating exercsies well, noting level SI alignment during todays treatment. Adding in 10# KB to her sqauts & RDLS requiring persistent cues to perform exercises with good form. Continued with proximal hip strengthening exercises coupled with core stabilization exercises with good return; She continues to report some pain right at the center of her sacrum when laying supine requiring a pillow for cushioning with fair return. progress as able. Patient will continue to benefit from skilled PT / OT services to modify and progress therapeutic interventions, analyze and address functional mobility deficits, analyze and address ROM deficits, analyze and address strength deficits, analyze and address soft tissue restrictions, analyze and cue for proper movement patterns, analyze and modify for postural abnormalities, analyze and address imbalance/dizziness, and instruct in home and community integration to address functional deficits and attain remaining goals. Progress toward goals / Updated goals:  []  See Progress Note/Recertification    Short Term Goals: To be accomplished in 2 weeks  1.    Pt will report compliance with HEP in order to supplement PT treatment              Eval = established              Reports initial

## 2023-09-13 ENCOUNTER — HOSPITAL ENCOUNTER (OUTPATIENT)
Facility: HOSPITAL | Age: 48
Setting detail: RECURRING SERIES
Discharge: HOME | End: 2023-09-16
Payer: COMMERCIAL

## 2023-09-13 PROCEDURE — 97530 THERAPEUTIC ACTIVITIES: CPT

## 2023-09-13 PROCEDURE — 97110 THERAPEUTIC EXERCISES: CPT

## 2023-09-13 PROCEDURE — 97112 NEUROMUSCULAR REEDUCATION: CPT

## 2023-09-13 NOTE — PROGRESS NOTES
PHYSICAL / OCCUPATIONAL THERAPY - DAILY TREATMENT NOTE (updated )    Patient Name: West Walker    Date: 2023    : 1975  Insurance: Payor: Kaylee Dominguez / Plan: Kaylee Dominguez / Product Type: *No Product type* /      Patient  verified Yes     Visit #   Current / Total 5 10   Time   In / Out 1050 1134   Pain   In / Out 3 2   Subjective Functional Status/Changes: Pt reports the side of her left hip was hurting after last visit and she still has some soreness. He back pain is there but not necessarily getting worse     TREATMENT AREA =  Other low back pain [M54.59]    OBJECTIVE         Therapeutic Procedures: Tx Min Billable or 1:1 Min (if diff from Tx Min) Procedure, Rationale, Specifics   10  51059 Therapeutic Exercise (timed):  increase ROM, strength, coordination, balance, and proprioception to improve patient's ability to progress to PLOF and address remaining functional goals. (see flow sheet as applicable)     Details if applicable:       10  40302 Neuromuscular Re-Education (timed):  improve balance, coordination, kinesthetic sense, posture, core stability and proprioception to improve patient's ability to develop conscious control of individual muscles and awareness of position of extremities in order to progress to PLOF and address remaining functional goals. (see flow sheet as applicable)     Details if applicable:  core zee   24  51858 Therapeutic Activity (timed):  use of dynamic activities replicating functional movements to increase ROM, strength, coordination, balance, and proprioception in order to improve patient's ability to progress to PLOF and address remaining functional goals.   (see flow sheet as applicable)     Details if applicable:  functional carry, lift, squats; pelvic shotgun    40  MC BC Totals Reminder: bill using total billable min of TIMED therapeutic procedures (example: do not include dry needle or estim unattended, both untimed codes, in totals to

## 2023-09-15 ENCOUNTER — HOSPITAL ENCOUNTER (OUTPATIENT)
Facility: HOSPITAL | Age: 48
Setting detail: RECURRING SERIES
Discharge: HOME | End: 2023-09-18
Payer: COMMERCIAL

## 2023-09-15 ENCOUNTER — TELEPHONE (OUTPATIENT)
Age: 48
End: 2023-09-15

## 2023-09-15 PROCEDURE — 97112 NEUROMUSCULAR REEDUCATION: CPT

## 2023-09-15 PROCEDURE — 97535 SELF CARE MNGMENT TRAINING: CPT

## 2023-09-15 PROCEDURE — 97110 THERAPEUTIC EXERCISES: CPT

## 2023-09-15 NOTE — TELEPHONE ENCOUNTER
Patient called regarding her return to work date. She is currently scheduled to return on 9/18, but would like to stay out until she is seen by Dr. Nadege Montesinos on 10/2. Patient is requesting updated work note to reflect this change. Patient was made aware that her provider is out of office today, 9/15.

## 2023-09-15 NOTE — PROGRESS NOTES
PHYSICAL / OCCUPATIONAL THERAPY - DAILY TREATMENT NOTE (updated )    Patient Name: Bernadene Harada    Date: 9/15/2023    : 1975  Insurance: Payor: Niesha Fisher / Plan: Niesha Fisher / Product Type: *No Product type* /      Patient  verified Yes     Visit #   Current / Total 6 10   Time   In / Out 1050 1142   Pain   In / Out 6 6   Subjective Functional Status/Changes: Pt reports she was sore in her legs after last visit. She was also sore in her [lower thoracic] more than her lower back, which was part of her initial complaints but not as intense as the low back. Now her midback hurts more      TREATMENT AREA =  Other low back pain [M54.59]    OBJECTIVE         Therapeutic Procedures: Tx Min Billable or 1:1 Min (if diff from Tx Min) Procedure, Rationale, Specifics   13  44962 Therapeutic Exercise (timed):  increase ROM, strength, coordination, balance, and proprioception to improve patient's ability to progress to PLOF and address remaining functional goals. (see flow sheet as applicable)     Details if applicable:       23  49373 Neuromuscular Re-Education (timed):  improve balance, coordination, kinesthetic sense, posture, core stability and proprioception to improve patient's ability to develop conscious control of individual muscles and awareness of position of extremities in order to progress to PLOF and address remaining functional goals. (see flow sheet as applicable)     Details if applicable:     8  77798 Self Care/Home Management (timed):  improve patient knowledge and understanding of pain reducing techniques, positioning, posture/ergonomics, home safety, activity modification, diagnosis/prognosis, and physical therapy expectations, procedures and progression  to improve patient's ability to progress to PLOF and address remaining functional goals.   (see flow sheet as applicable)     Details if applicable:     40  [address] Reminder: bill using total billable min of TIMED

## 2023-09-18 ENCOUNTER — TELEMEDICINE (OUTPATIENT)
Age: 48
End: 2023-09-18
Payer: COMMERCIAL

## 2023-09-18 DIAGNOSIS — M54.50 CHRONIC BILATERAL LOW BACK PAIN WITHOUT SCIATICA: Primary | ICD-10-CM

## 2023-09-18 DIAGNOSIS — G89.29 CHRONIC BILATERAL LOW BACK PAIN WITHOUT SCIATICA: Primary | ICD-10-CM

## 2023-09-18 PROCEDURE — 99441 PR PHYS/QHP TELEPHONE EVALUATION 5-10 MIN: CPT | Performed by: PHYSICAL MEDICINE & REHABILITATION

## 2023-09-18 NOTE — PROGRESS NOTES
[facility]  [address] [facility] [address], [address] [place] [address]   Phone: [phone]  Fax: [phone]      Patient: Sindhu Madera                                                                              MRN: [id number]        YOB: 1975          AGE: 46 y.o. PCP: ERICA Sexton - NP  Date:  09/18/23    Reason for Consultation: No chief complaint on file. HPI:  Sindhu Madera is a 46 y.o. female with relevant PMH of HTN who presented with mid and low back pain which began after an while lifting at work 6/17/23. The following day felt back pain and saw her PCP 6/19/23 who got x-rays of her spine and had her rest from work and gave her muscle relaxants and NSAIDS. She returned to work 6/26/23 but pain persisted and she saw her pcp and was held out of work from 7/22/23. She is feeling a bit better and started PT 8/31/23- she has had 6 sessions. Works at [employer]    Neurologic symptoms: No numbness, tingling, weakness, bowel or bladder changes. No recent falls      Location: The pain is located in the low back pain    Radiation: The pain does not radiate . Pain Score: Currently: 6/10    Quality: Pain is of a sharp, cramping, spasm quality. Aggravating: Pain is exacerbated by walking and standing  Alleviating: The pain is alleviated by lying down    Prior Treatments:  Physical therapy: Yes started 8/31/23  Injections:No  Surgery:No  Previous Medications: flexeril 10mg , robaxin,  Current Medications: ibuprofen 600mg   Previous work-up has included:   X-ray lumbar spine 6/19/2023-  DEGENERATIVE CHANGES:  Moderate degenerative changes dominant posterior  elements of the middle and lower lumbar spine.       Past Medical History:   Past Medical History:   Diagnosis Date    Asthma     Hypertension     Pre-diabetes       Past Surgical History:   Past Surgical History:   Procedure Laterality Date    BREAST BIOPSY Right

## 2023-09-18 NOTE — TELEPHONE ENCOUNTER
Called patient [phone] and verified her name and date of birth. I inquired if she was aminah viable today to discuss her out of work note today in a virtual visit. Patient verbalized yes. I made her an appointment for today 9/18/2023 at 330 pm. No further action needed at this time.

## 2023-09-20 ENCOUNTER — HOSPITAL ENCOUNTER (OUTPATIENT)
Facility: HOSPITAL | Age: 48
Setting detail: RECURRING SERIES
End: 2023-09-20
Payer: COMMERCIAL

## 2023-09-21 ENCOUNTER — HOSPITAL ENCOUNTER (OUTPATIENT)
Facility: HOSPITAL | Age: 48
Setting detail: RECURRING SERIES
Discharge: HOME | End: 2023-09-24
Payer: COMMERCIAL

## 2023-09-21 PROCEDURE — 97535 SELF CARE MNGMENT TRAINING: CPT

## 2023-09-21 PROCEDURE — 97112 NEUROMUSCULAR REEDUCATION: CPT

## 2023-09-21 PROCEDURE — 97530 THERAPEUTIC ACTIVITIES: CPT

## 2023-09-21 PROCEDURE — 97110 THERAPEUTIC EXERCISES: CPT

## 2023-09-21 NOTE — PROGRESS NOTES
PHYSICAL / OCCUPATIONAL THERAPY - DAILY TREATMENT NOTE (updated )    Patient Name: Dottie Perry    Date: 2023    : 1975  Insurance: Payor: Laovn Wen / Plan: Lavon Wen / Product Type: *No Product type* /      Patient  verified Yes     Visit #   Current / Total 7 10   Time   In / Out 209 250   Pain   In / Out 0 0   Subjective Functional Status/Changes: \"No pain today believe it or not. \"     TREATMENT AREA =  Other low back pain [M54.59]    OBJECTIVE         Therapeutic Procedures: Tx Min Billable or 1:1 Min (if diff from Tx Min) Procedure, Rationale, Specifics   10  86957 Therapeutic Exercise (timed):  increase ROM, strength, coordination, balance, and proprioception to improve patient's ability to progress to PLOF and address remaining functional goals. (see flow sheet as applicable)     Details if applicable:        Neuromuscular Re-Education (timed):  improve balance, coordination, kinesthetic sense, posture, core stability and proprioception to improve patient's ability to develop conscious control of individual muscles and awareness of position of extremities in order to progress to PLOF and address remaining functional goals. (see flow sheet as applicable)     Details if applicable:     10  43025 Self Care/Home Management (timed):  improve patient knowledge and understanding of pain reducing techniques, positioning, posture/ergonomics, home safety, activity modification, diagnosis/prognosis, and physical therapy expectations, procedures and progression  to improve patient's ability to progress to PLOF and address remaining functional goals.   (see flow sheet as applicable)     Details if applicable:     10  35346 Therapeutic Activity (timed):  use of dynamic activities replicating functional movements to increase ROM, strength, coordination, balance, and proprioception in order to improve patient's ability to progress to PLOF and address remaining functional

## 2023-09-22 ENCOUNTER — HOSPITAL ENCOUNTER (OUTPATIENT)
Facility: HOSPITAL | Age: 48
Setting detail: RECURRING SERIES
Discharge: HOME | End: 2023-09-25
Payer: COMMERCIAL

## 2023-09-22 PROCEDURE — 97110 THERAPEUTIC EXERCISES: CPT

## 2023-09-22 PROCEDURE — 97535 SELF CARE MNGMENT TRAINING: CPT

## 2023-09-22 PROCEDURE — 97530 THERAPEUTIC ACTIVITIES: CPT

## 2023-09-22 PROCEDURE — 97112 NEUROMUSCULAR REEDUCATION: CPT

## 2023-09-22 NOTE — PROGRESS NOTES
demonstrate bilateral hip abduction/extension strength 5-/5 in order to increased ambulation distances in the community              Eval = 4-/5 into both planes bilaterally               Reassess NV  3.    Pt will report max pain 3/10 in order to increase participation in therapy              Eval = 10/10              Reassess NV    Next PN/ RC due 09/27/2023  Auth due 4 more    PLAN  - Continue Plan of [address] [place], [place], [facility]    9/22/2023    10:48 AM    Future Appointments   Date Time Provider [address] [place] [address]   9/22/2023 10:50 AM Zara Spencer, PT [facility]   9/27/2023 10:50 AM Doe Rocha, PTA [facility]   9/29/2023 10:50 AM Zara Spencer, PT [facility]   10/2/2023  9:45 AM Arcelia Daniel MD [facility]

## 2023-09-27 ENCOUNTER — HOSPITAL ENCOUNTER (OUTPATIENT)
Facility: HOSPITAL | Age: 48
Setting detail: RECURRING SERIES
Discharge: HOME | End: 2023-09-30
Payer: COMMERCIAL

## 2023-09-27 PROCEDURE — 97110 THERAPEUTIC EXERCISES: CPT

## 2023-09-27 PROCEDURE — 97112 NEUROMUSCULAR REEDUCATION: CPT

## 2023-09-27 PROCEDURE — 97530 THERAPEUTIC ACTIVITIES: CPT

## 2023-09-27 NOTE — PROGRESS NOTES
[facility] - [address] [facility] [address], [address] [facility]  [phone] fax    PROGRESS NOTE  Patient Name: Melo Ojeda : 1975   Treatment/Medical Diagnosis: Other low back pain [M54.59]   Referral Source: Mushtaq Villafuerte*     Date of Initial Visit: 2023 Attended Visits: 9 Missed Visits: 0     SUMMARY OF TREATMENT  Ms. Lien Bob reports 50% improvement since starting therapy stating her lifting mechanics have improved and she is more aware of her body mechanics, and she is not having as much pain daily. She continues to have difficulty with flare ups of pain, pain with lifting and prolonged sitting, and has not started walking program yet. She has made progress with bilateral hip strength, core stability, and lifting mechanics. She will benefit from continued skilled therapy to further improve her tolerance to ADLs, reduce pain, and allow her to perform work duties at [employer]. CURRENT STATUS  Short Term Goals: To be accomplished in 2 weeks  1. Pt will report compliance with HEP in order to supplement PT treatment              Eval = established              Reports continued compliance  2. Pt will report max pain 6/10 in order to increase participation in therapy              Eval = 10/10              MET: 7 day max pain = 6/10  Long Term Goals: To be accomplished in 10 treatments  1. Pt will score at least 65 on FOTO in order to improve overall function, decrease pain, and facilitate return to PLOF. Eval = 56              Regressed: 44 - likely due to questions not asked at evaluation  2. Pt will demonstrate bilateral hip abduction/extension strength 5-/5 in order to increased ambulation distances in the community              Eval = 4-/5 into both planes bilaterally               Progressin+/5 into both planes bilaterally  3.    Pt will report max pain 3/10 in order to increase participation in therapy

## 2023-09-27 NOTE — PROGRESS NOTES
PHYSICAL / OCCUPATIONAL THERAPY - DAILY TREATMENT NOTE (updated )    Patient Name: Melo Ojeda    Date: 2023    : 1975  Insurance: Payor: Camden Gastelum / Plan: Camden Gastelum / Product Type: *No Product type* /      Patient  verified Yes     Visit #   Current / Total 9 10   Time   In / Out 1050 1130   Pain   In / Out 6 5   Subjective Functional Status/Changes: Pt reports she was hurting a little when she left Friday, did a lot of working out Saturday, and has had increased pain since . TREATMENT AREA =  Other low back pain [M54.59]    OBJECTIVE    Therapeutic Procedures: Tx Min Billable or 1:1 Min (if diff from Tx Min) Procedure, Rationale, Specifics   8  37289 Therapeutic Exercise (timed):  increase ROM, strength, coordination, balance, and proprioception to improve patient's ability to progress to PLOF and address remaining functional goals. (see flow sheet as applicable)     Details if applicable:       8  63559 Neuromuscular Re-Education (timed):  improve balance, coordination, kinesthetic sense, posture, core stability and proprioception to improve patient's ability to develop conscious control of individual muscles and awareness of position of extremities in order to progress to PLOF and address remaining functional goals. (see flow sheet as applicable)     Details if applicable:  core/hip stabilization   24  83317 Therapeutic Activity (timed):  use of dynamic activities replicating functional movements to increase ROM, strength, coordination, balance, and proprioception in order to improve patient's ability to progress to PLOF and address remaining functional goals.   (see flow sheet as applicable)     Details if applicable:  FOTO, goals assessment   40  [facility] Totals Reminder: bill using total billable min of TIMED therapeutic procedures (example: do not include dry needle or estim unattended, both untimed codes, in totals to left)  8-22 min = 1 unit; 23-37 min = 2

## 2023-09-29 ENCOUNTER — HOSPITAL ENCOUNTER (OUTPATIENT)
Facility: HOSPITAL | Age: 48
Setting detail: RECURRING SERIES
End: 2023-09-29
Payer: COMMERCIAL

## 2023-09-29 PROCEDURE — 97530 THERAPEUTIC ACTIVITIES: CPT

## 2023-09-29 PROCEDURE — 97535 SELF CARE MNGMENT TRAINING: CPT

## 2023-09-29 PROCEDURE — 97110 THERAPEUTIC EXERCISES: CPT

## 2023-09-29 PROCEDURE — 97112 NEUROMUSCULAR REEDUCATION: CPT

## 2023-09-29 NOTE — PROGRESS NOTES
PHYSICAL / OCCUPATIONAL THERAPY - DAILY TREATMENT NOTE (updated )    Patient Name: West Walker    Date: 2023    : 1975  Insurance: Payor: Kaylee Dominguez / Plan: Kaylee Dominguez / Product Type: *No Product type* /      Patient  verified Yes     Visit #   Current / Total 1 10   Time   In / Out 1050 1130   Pain   In / Out 5-6 5   Subjective Functional Status/Changes: \"I started having some burning in my lower back. I did a lot yesterday. \"     TREATMENT AREA =  Other low back pain [M54.59]    OBJECTIVE         Therapeutic Procedures: Tx Min Billable or 1:1 Min (if diff from Tx Min) Procedure, Rationale, Specifics   10  94438 Therapeutic Exercise (timed):  increase ROM, strength, coordination, balance, and proprioception to improve patient's ability to progress to PLOF and address remaining functional goals. (see flow sheet as applicable)     Details if applicable:       12  77715 Neuromuscular Re-Education (timed):  improve balance, coordination, kinesthetic sense, posture, core stability and proprioception to improve patient's ability to develop conscious control of individual muscles and awareness of position of extremities in order to progress to PLOF and address remaining functional goals. (see flow sheet as applicable)     Details if applicable:     10  63575 Therapeutic Activity (timed):  use of dynamic activities replicating functional movements to increase ROM, strength, coordination, balance, and proprioception in order to improve patient's ability to progress to PLOF and address remaining functional goals.   (see flow sheet as applicable)     Details if applicable:     8  96595 Self Care/Home Management (timed):  improve patient knowledge and understanding of pain reducing techniques, positioning, posture/ergonomics, home safety, activity modification, diagnosis/prognosis, and physical therapy expectations, procedures and progression  to improve patient's ability to progress to demonstrate bilateral hip abduction/extension strength 5-/5 in order to increased ambulation distances in the community              PN status: Progressin+/5 into both planes bilaterally  4.    Pt will report max pain 3/10 in order to increase participation in therapy              PN status: Progressin day max pain = 6/10    Next PN/ RC due 10/26/2023  Auth due 2 more visits    PLAN  - Continue Plan of [facility], PTA, CSCS    2023    10:52 AM    Future Appointments   Date Time Provider [address]   10/2/2023  9:45 AM Arcelia Perez MD [facility] AMB   10/4/2023 10:50 AM Mor Ventura PTA [facility]   10/6/2023 10:50 AM Mor Ventura PTA [facility]   10/11/2023 10:50 AM Mor Ventura PTA [facility]   10/13/2023 10:50 AM Mor Ventura PTA [facility]

## 2023-10-03 ENCOUNTER — TELEMEDICINE (OUTPATIENT)
Age: 48
End: 2023-10-03
Payer: COMMERCIAL

## 2023-10-03 DIAGNOSIS — M54.50 CHRONIC BILATERAL LOW BACK PAIN WITHOUT SCIATICA: Primary | ICD-10-CM

## 2023-10-03 DIAGNOSIS — G89.29 CHRONIC BILATERAL LOW BACK PAIN WITHOUT SCIATICA: Primary | ICD-10-CM

## 2023-10-03 PROCEDURE — 99442 PR PHYS/QHP TELEPHONE EVALUATION 11-20 MIN: CPT | Performed by: PHYSICAL MEDICINE & REHABILITATION

## 2023-10-03 NOTE — PROGRESS NOTES
Laterality Date    BREAST BIOPSY Right 2016    EXCISIONAL BIOPSY OF RIGHT BREAST MASS performed by Aakash Singh MD at [address]      x2 csection     REMOVAL OF BREAST LESION Right 16    Dr. Seth Blackwood        SocHx:   Social History     Tobacco Use    Smoking status: Every Day     Packs/day: 1     Types: Cigarettes     Last attempt to quit: 2016     Years since quittin.4    Smokeless tobacco: Former     Quit date: 2016   Substance Use Topics    Alcohol use: No      FamHx:? Family History   Problem Relation Age of Onset    Hypertension Mother     Stroke Mother     Kidney Disease Maternal Grandmother     Diabetes Mother        Current Medications:   Current Outpatient Medications   Medication Sig Dispense Refill    Albuterol Sulfate, sensor, (PROAIR DIGIHALER) 108 (90 Base) MCG/ACT AEPB Inhale 2 puffs into the lungs      amLODIPine (NORVASC) 5 MG tablet Take by mouth      ergocalciferol (ERGOCALCIFEROL) 1.25 MG (48445 UT) capsule       Fluticasone Propionate, Inhal, (FLOVENT DISKUS) 50 MCG/ACT AEPB Inhale 1 puff into the lungs      methocarbamol (ROBAXIN) 500 MG tablet TAKE 1 TO 2 TABLETS BY MOUTH EVERY 8 HOURS AS NEEDED      rosuvastatin (CRESTOR) 5 MG tablet Take by mouth      acetaminophen (TYLENOL) 325 MG tablet Take 650 mg by mouth every 6 hours as needed (Patient not taking: Reported on 2023)      ibuprofen (ADVIL;MOTRIN) 600 MG tablet Take 1 tablet by mouth every 6 hours as needed      losartan (COZAAR) 100 MG tablet Take 1 tablet by mouth daily       No current facility-administered medications for this visit. Allergies:  No Known Allergies          Assessment:   -low back pain spondylosis, DDD    Plan:      -Physical therapy - continue physical therapy  -Medications - continue ibuprofen prn and robaxin as needed.  Counseled regarding side effects and appropriate administration of medications.    -Diagnostics/Imaging - MRI lumbar spine

## 2023-10-03 NOTE — PATIENT INSTRUCTIONS
[facility] Radiology    Please expect an automated call within 24-48 business hours to schedule your outpatient study with [address] [place] [address]    If you have not received an automated call, please call [phone] to speak directly with a     [address] [place] [address] at [facility]

## 2023-10-04 ENCOUNTER — HOSPITAL ENCOUNTER (OUTPATIENT)
Facility: HOSPITAL | Age: 48
Setting detail: RECURRING SERIES
Discharge: HOME | End: 2023-10-07
Payer: COMMERCIAL

## 2023-10-04 PROCEDURE — 97110 THERAPEUTIC EXERCISES: CPT

## 2023-10-04 PROCEDURE — 97530 THERAPEUTIC ACTIVITIES: CPT

## 2023-10-04 PROCEDURE — 97112 NEUROMUSCULAR REEDUCATION: CPT

## 2023-10-04 NOTE — PROGRESS NOTES
PHYSICAL / OCCUPATIONAL THERAPY - DAILY TREATMENT NOTE (updated )    Patient Name: Ellis Serrato    Date: 10/4/2023    : 1975  Insurance: Payor: Alejandra Spencer / Plan: Alejandra Spencer / Product Type: *No Product type* /      Patient  verified Yes     Visit #   Current / Total 2 10   Time   In / Out 1050 1130   Pain   In / Out 3 3   Subjective Functional Status/Changes: Pt reports her pain isn't that bad today, it's been coming down since that recent flare up. Her doctor ordered an MRI. TREATMENT AREA =  Other low back pain [M54.59]    OBJECTIVE    Therapeutic Procedures: Tx Min Billable or 1:1 Min (if diff from Tx Min) Procedure, Rationale, Specifics   23  80863 Therapeutic Exercise (timed):  increase ROM, strength, coordination, balance, and proprioception to improve patient's ability to progress to PLOF and address remaining functional goals. (see flow sheet as applicable)     Details if applicable:       9  60688 Neuromuscular Re-Education (timed):  improve balance, coordination, kinesthetic sense, posture, core stability and proprioception to improve patient's ability to develop conscious control of individual muscles and awareness of position of extremities in order to progress to PLOF and address remaining functional goals. (see flow sheet as applicable)     Details if applicable:  core re-ed   8  85884 Therapeutic Activity (timed):  use of dynamic activities replicating functional movements to increase ROM, strength, coordination, balance, and proprioception in order to improve patient's ability to progress to PLOF and address remaining functional goals.   (see flow sheet as applicable)     Details if applicable:  standing functional hip strength   40  [facility] Totals Reminder: bill using total billable min of TIMED therapeutic procedures (example: do not include dry needle or estim unattended, both untimed codes, in totals to left)  8-22 min = 1 unit; 23-37 min = 2 units; 38-52 min =

## 2023-10-06 ENCOUNTER — HOSPITAL ENCOUNTER (OUTPATIENT)
Facility: HOSPITAL | Age: 48
Setting detail: RECURRING SERIES
Discharge: HOME | End: 2023-10-09
Payer: COMMERCIAL

## 2023-10-06 PROCEDURE — 97110 THERAPEUTIC EXERCISES: CPT

## 2023-10-06 PROCEDURE — 97112 NEUROMUSCULAR REEDUCATION: CPT

## 2023-10-06 PROCEDURE — 97530 THERAPEUTIC ACTIVITIES: CPT

## 2023-10-06 NOTE — PROGRESS NOTES
= 5 units   Total Total     [x]  Patient Education billed concurrently with other procedures   [x] Review HEP    [] Progressed/Changed HEP, detail:    [] Other detail:       Objective Information/Functional Measures/Assessment    Pt reports steadily improving back pain and able to resume some lifting/carrying activities today. Did not progress program beyond what pt has been performing to avoid exacerbation of pain. She reports no increase of pain following session. Patient will continue to benefit from skilled PT / OT services to modify and progress therapeutic interventions, analyze and address functional mobility deficits, analyze and address ROM deficits, analyze and address strength deficits, analyze and address soft tissue restrictions, analyze and cue for proper movement patterns, analyze and modify for postural abnormalities, analyze and address imbalance/dizziness, and instruct in home and community integration to address functional deficits and attain remaining goals. Progress toward goals / Updated goals:  []  See Progress Note/Recertification    1. Pt will report compliance with HEP in order to supplement PT treatment              PN status: Reports continued compliance   Continued compliance- update as appropriate  2. Pt will score at least 65 on FOTO in order to improve overall function, decrease pain, and facilitate return to PLOF. PN status: Regressed: 40 - likely due to questions not asked at evaluation   Reassess at 30 days  3. Pt will demonstrate bilateral hip abduction/extension strength 5-/5 in order to increased ambulation distances in the community              PN status: Progressin+/5 into both planes bilaterally              Tolerating progression of exercises  4.    Pt will report max pain 3/10 in order to increase participation in therapy              PN status: Progressin day max pain = 6/10              Pain is declining post flare up     Next PN/ RC due

## 2023-10-11 ENCOUNTER — HOSPITAL ENCOUNTER (OUTPATIENT)
Facility: HOSPITAL | Age: 48
Setting detail: RECURRING SERIES
Discharge: HOME | End: 2023-10-14
Payer: COMMERCIAL

## 2023-10-11 PROCEDURE — 97112 NEUROMUSCULAR REEDUCATION: CPT

## 2023-10-11 PROCEDURE — 97530 THERAPEUTIC ACTIVITIES: CPT

## 2023-10-11 PROCEDURE — 97110 THERAPEUTIC EXERCISES: CPT

## 2023-10-11 NOTE — PROGRESS NOTES
PHYSICAL / OCCUPATIONAL THERAPY - DAILY TREATMENT NOTE (updated )    Patient Name: Melo Ojeda    Date: 10/11/2023    : 1975  Insurance: Payor: /      Patient  verified Yes     Visit #   Current / Total 4 10   Time   In / Out 1050 1135   Pain   In / Out 2 2   Subjective Functional Status/Changes: Pt reports her pain is getting better. She has an MRI tomorrow. TREATMENT AREA =  Other low back pain [M54.59]    OBJECTIVE    Therapeutic Procedures: Tx Min Billable or 1:1 Min (if diff from Tx Min) Procedure, Rationale, Specifics   10  13031 Therapeutic Exercise (timed):  increase ROM, strength, coordination, balance, and proprioception to improve patient's ability to progress to PLOF and address remaining functional goals. (see flow sheet as applicable)     Details if applicable:       10  27919 Neuromuscular Re-Education (timed):  improve balance, coordination, kinesthetic sense, posture, core stability and proprioception to improve patient's ability to develop conscious control of individual muscles and awareness of position of extremities in order to progress to PLOF and address remaining functional goals. (see flow sheet as applicable)     Details if applicable:  core re-ed   25  03261 Therapeutic Activity (timed):  use of dynamic activities replicating functional movements to increase ROM, strength, coordination, balance, and proprioception in order to improve patient's ability to progress to PLOF and address remaining functional goals.   (see flow sheet as applicable)     Details if applicable:  squatting/lifting/carrying, standing functional hip strength   45  [facility] Totals Reminder: bill using total billable min of TIMED therapeutic procedures (example: do not include dry needle or estim unattended, both untimed codes, in totals to left)  8-22 min = 1 unit; 23-37 min = 2 units; 38-52 min = 3 units; 53-67 min = 4 units; 68-82 min = 5 units   Total Total     [x]  Patient Education billed

## 2023-10-12 ENCOUNTER — HOSPITAL ENCOUNTER (OUTPATIENT)
Facility: HOSPITAL | Age: 48
Discharge: HOME | End: 2023-10-12
Attending: PHYSICAL MEDICINE & REHABILITATION
Payer: COMMERCIAL

## 2023-10-12 DIAGNOSIS — M54.50 CHRONIC BILATERAL LOW BACK PAIN WITHOUT SCIATICA: ICD-10-CM

## 2023-10-12 DIAGNOSIS — G89.29 CHRONIC BILATERAL LOW BACK PAIN WITHOUT SCIATICA: ICD-10-CM

## 2023-10-12 PROCEDURE — 72148 MRI LUMBAR SPINE W/O DYE: CPT

## 2023-10-13 ENCOUNTER — HOSPITAL ENCOUNTER (OUTPATIENT)
Facility: HOSPITAL | Age: 48
Setting detail: RECURRING SERIES
Discharge: HOME | End: 2023-10-16
Payer: COMMERCIAL

## 2023-10-13 PROCEDURE — 97530 THERAPEUTIC ACTIVITIES: CPT

## 2023-10-13 PROCEDURE — 97110 THERAPEUTIC EXERCISES: CPT

## 2023-10-13 PROCEDURE — 97535 SELF CARE MNGMENT TRAINING: CPT

## 2023-10-13 PROCEDURE — 97112 NEUROMUSCULAR REEDUCATION: CPT

## 2023-10-13 NOTE — PROGRESS NOTES
appropriate  2. Pt will score at least 65 on FOTO in order to improve overall function, decrease pain, and facilitate return to PLOF. PN status: Regressed: 40 - likely due to questions not asked at evaluation              Reassess at 30 days  3. Pt will demonstrate bilateral hip abduction/extension strength 5-/5 in order to increased ambulation distances in the community              PN status: Progressin+/5 into both planes bilaterally              Measure NV  4.    Pt will report max pain 3/10 in order to increase participation in therapy              PN status: Progressin day max pain = 6/10              Pain is declining post flare up     Next PN/ RC due 10/26/2023  Auth due and has been requested    [facility] activities as tolerated    Reynaldo Fong PTA    10/13/2023    10:15 AM    Future Appointments   Date Time Provider [address] [place] [address]   10/13/2023 10:50 AM Janak Sandoval PTA [facility]   2023  4:00 PM Wang Arriola MD [facility] AMB

## 2023-10-18 DIAGNOSIS — G89.29 CHRONIC BILATERAL LOW BACK PAIN WITHOUT SCIATICA: Primary | ICD-10-CM

## 2023-10-18 DIAGNOSIS — M54.50 CHRONIC BILATERAL LOW BACK PAIN WITHOUT SCIATICA: Primary | ICD-10-CM

## 2023-10-25 ENCOUNTER — HOSPITAL ENCOUNTER (OUTPATIENT)
Facility: HOSPITAL | Age: 48
Setting detail: RECURRING SERIES
Discharge: HOME | End: 2023-10-28
Payer: COMMERCIAL

## 2023-10-25 PROCEDURE — 97530 THERAPEUTIC ACTIVITIES: CPT

## 2023-10-25 PROCEDURE — 97110 THERAPEUTIC EXERCISES: CPT

## 2023-10-25 PROCEDURE — 97535 SELF CARE MNGMENT TRAINING: CPT

## 2023-10-25 NOTE — PROGRESS NOTES
PHYSICAL / OCCUPATIONAL THERAPY - DAILY TREATMENT NOTE (updated )    Patient Name: West Walker    Date: 10/25/2023    : 1975  Insurance: Payor: Kaylee Dominguez / Plan: Kaylee Dominguez / Product Type: *No Product type* /      Patient  verified Yes     Visit #   Current / Total 6 10   Time   In / Out 934 1010   Pain   In / Out 2 2   Subjective Functional Status/Changes: Pt reporting that her back is a little sore today. TREATMENT AREA =  Other low back pain [M54.59]    OBJECTIVE         Therapeutic Procedures: Tx Min Billable or 1:1 Min (if diff from Tx Min) Procedure, Rationale, Specifics   20 20 43950 Therapeutic Activity (timed):  use of dynamic activities replicating functional movements to increase ROM, strength, coordination, balance, and proprioception in order to improve patient's ability to progress to PLOF and address remaining functional goals. (see flow sheet as applicable)     Details if applicable:  FOTO, Reassessment; Strength testing     8 8 47429 Therapeutic Exercise (timed):  increase ROM, strength, coordination, balance, and proprioception to improve patient's ability to progress to PLOF and address remaining functional goals. (see flow sheet as applicable)     Details if applicable:     8 8 48558 Self Care/Home Management (timed):  improve patient knowledge and understanding of pain reducing techniques, positioning, posture/ergonomics, and home safety  to improve patient's ability to progress to PLOF and address remaining functional goals.   (see flow sheet as applicable)     Details if applicable:     39 36 [facility] Totals Reminder: bill using total billable min of TIMED therapeutic procedures (example: do not include dry needle or estim unattended, both untimed codes, in totals to left)  8-22 min = 1 unit; 23-37 min = 2 units; 38-52 min = 3 units; 53-67 min = 4 units; 68-82 min = 5 units   Total Total     [x]  Patient Education billed concurrently with other procedures
of your patient.   PTA Signature      Therapist Signature: Ethan Villalta PT Date: 10/25/2023   Reporting Period  9/27/23 - 10/25/23 Time: 1:03 PM

## 2023-11-01 ENCOUNTER — HOSPITAL ENCOUNTER (OUTPATIENT)
Facility: HOSPITAL | Age: 48
Setting detail: RECURRING SERIES
Discharge: HOME | End: 2023-11-04
Payer: COMMERCIAL

## 2023-11-01 PROCEDURE — 97112 NEUROMUSCULAR REEDUCATION: CPT

## 2023-11-01 PROCEDURE — 97530 THERAPEUTIC ACTIVITIES: CPT

## 2023-11-01 PROCEDURE — 97110 THERAPEUTIC EXERCISES: CPT

## 2023-11-01 PROCEDURE — 97535 SELF CARE MNGMENT TRAINING: CPT

## 2023-11-01 NOTE — PROGRESS NOTES
PHYSICAL / OCCUPATIONAL THERAPY - DAILY TREATMENT NOTE (updated )    Patient Name: Sylwia Ibanez    Date: 2023    : 1975  Insurance: Payor: Natalie Vitale / Plan: Natalie Vitale / Product Type: *No Product type* /      Patient  verified Yes     Visit #   Current / Total 1 10   Time   In / Out 455 530   Pain   In / Out 3 3   Subjective Functional Status/Changes: \"I have some pain in my back. I see the doctor tomorrow. \"     TREATMENT AREA =  Other low back pain [M54.59]    OBJECTIVE         Therapeutic Procedures: Tx Min Billable or 1:1 Min (if diff from Tx Min) Procedure, Rationale, Specifics   10  10817 Therapeutic Exercise (timed):  increase ROM, strength, coordination, balance, and proprioception to improve patient's ability to progress to PLOF and address remaining functional goals. (see flow sheet as applicable)     Details if applicable:       9  91061 Neuromuscular Re-Education (timed):  improve balance, coordination, kinesthetic sense, posture, core stability and proprioception to improve patient's ability to develop conscious control of individual muscles and awareness of position of extremities in order to progress to PLOF and address remaining functional goals. (see flow sheet as applicable)     Details if applicable:     8  80292 Therapeutic Activity (timed):  use of dynamic activities replicating functional movements to increase ROM, strength, coordination, balance, and proprioception in order to improve patient's ability to progress to PLOF and address remaining functional goals.   (see flow sheet as applicable)     Details if applicable:     8  33478 Self Care/Home Management (timed):  improve patient knowledge and understanding of pain reducing techniques, positioning, posture/ergonomics, home safety, activity modification, diagnosis/prognosis, and physical therapy expectations, procedures and progression  to improve patient's ability to progress to PLOF and address

## 2023-11-02 ENCOUNTER — TELEMEDICINE (OUTPATIENT)
Age: 48
End: 2023-11-02
Payer: COMMERCIAL

## 2023-11-02 ENCOUNTER — HOSPITAL ENCOUNTER (OUTPATIENT)
Facility: HOSPITAL | Age: 48
Setting detail: RECURRING SERIES
Discharge: HOME | End: 2023-11-05
Payer: COMMERCIAL

## 2023-11-02 DIAGNOSIS — M54.50 CHRONIC BILATERAL LOW BACK PAIN WITHOUT SCIATICA: Primary | ICD-10-CM

## 2023-11-02 DIAGNOSIS — G89.29 CHRONIC BILATERAL LOW BACK PAIN WITHOUT SCIATICA: Primary | ICD-10-CM

## 2023-11-02 PROCEDURE — 97112 NEUROMUSCULAR REEDUCATION: CPT

## 2023-11-02 PROCEDURE — 97110 THERAPEUTIC EXERCISES: CPT

## 2023-11-02 PROCEDURE — 99442 PR PHYS/QHP TELEPHONE EVALUATION 11-20 MIN: CPT | Performed by: PHYSICAL MEDICINE & REHABILITATION

## 2023-11-02 PROCEDURE — 97535 SELF CARE MNGMENT TRAINING: CPT

## 2023-11-02 PROCEDURE — 97530 THERAPEUTIC ACTIVITIES: CPT

## 2023-11-02 ASSESSMENT — PATIENT HEALTH QUESTIONNAIRE - PHQ9
SUM OF ALL RESPONSES TO PHQ9 QUESTIONS 1 & 2: 2
SUM OF ALL RESPONSES TO PHQ QUESTIONS 1-9: 2
SUM OF ALL RESPONSES TO PHQ QUESTIONS 1-9: 2
1. LITTLE INTEREST OR PLEASURE IN DOING THINGS: 1
2. FEELING DOWN, DEPRESSED OR HOPELESS: 1
SUM OF ALL RESPONSES TO PHQ QUESTIONS 1-9: 2
SUM OF ALL RESPONSES TO PHQ QUESTIONS 1-9: 2

## 2023-11-02 NOTE — PROGRESS NOTES
Ellis Serrato presents today for   Chief Complaint   Patient presents with    Back Pain       Is someone accompanying this pt? no    Is the patient using any DME equipment during OV? no    Depression Screening:       No data to display                Learning Assessment:  No flowsheet data found. Abuse Screening:       No data to display                Fall Risk  No flowsheet data found. OPIOID RISK TOOL  No flowsheet data found. Coordination of Care:  1. Have you been to the ER, urgent care clinic since your last visit? no  Hospitalized since your last visit? no    2. Have you seen or consulted any other health care providers outside of the [address] [place] [address] since your last visit? no Include any pap smears or colon screening.  no

## 2023-11-02 NOTE — PROGRESS NOTES
Right: 5-/5 in both planes     4. Pt will report max pain 3/10 in order to increase participation in therapy              PN Status: No change, still 6/10 at worst but notes a decrease in frequency.       Next PN/ RC due 11/23/2023  Auth due 12 more by     [facility] activities as tolerated    Lisa Shannon PTA    11/2/2023    10:44 AM    Future Appointments   Date Time Provider [address]   11/2/2023 10:50 AM Jayla Marroquin PTA [facility]   11/2/2023  4:00 PM Rigo Bagley MD [facility] AMB   11/7/2023 10:10 AM Abram Rosas, PT [facility]   11/9/2023 10:10 AM Jayla Marroquin PTA [facility]   11/14/2023 10:10 AM Parish Izquierdo PTA [facility]   11/16/2023 10:10 AM Jayla Marroquin PTA [facility]   11/28/2023 10:10 AM Parish Izquierdo PTA [facility]   11/30/2023 10:10 AM Cynthia Asencio PTA [facility]

## 2023-11-02 NOTE — PROGRESS NOTES
[facility]  [address] [facility] [address], [address] [place] [address]   Phone: [phone]  Fax: [phone]      Patient: Koby Perez                                                                              MRN: [id number]        YOB: 1975          AGE: 46 y.o. PCP: Wilbern Gosselin, APRN - NP  Date:  11/02/23    Reason for Consultation: Back Pain      HPI:  Koby Perez is a 46 y.o. female with relevant PMH of HTN who presented with mid and low back pain which began after an while lifting at work 6/17/23. The following day felt back pain and saw her PCP 6/19/23 who got x-rays of her spine and had her rest from work and gave her muscle relaxants and NSAIDS. She returned to work 6/26/23 but pain persisted and she saw her pcp and was held out of work from 7/22/23. She is feeling a bit better and started PT 8/31/23- which helps a bit but she continues to have low back pain. MRI lumbar spine 10/12/2023 unremarkable aside from lower lumbar facet arthritis     Works at [employer]    Neurologic symptoms: No numbness, tingling, weakness, bowel or bladder changes. No recent falls      Location: The pain is located in the low back pain    Radiation: The pain does not radiate . Pain Score: Currently: 6/10    Quality: Pain is of a sharp, cramping, spasm quality. Aggravating: Pain is exacerbated by walking and standing  Alleviating: The pain is alleviated by lying down    Prior Treatments:  Physical therapy: Yes started 8/31/23  Injections:No  Surgery:No  Previous Medications: flexeril 10mg , robaxin,  Current Medications: ibuprofen 600mg   Previous work-up has included:   X-ray lumbar spine 6/19/2023-  DEGENERATIVE CHANGES:  Moderate degenerative changes dominant posterior  elements of the middle and lower lumbar spine. MRI Result (most recent):   MRI LUMBAR SPINE WO CONTRAST 10/12/2023    Narrative  MRI Lumbar Spine Without

## 2023-11-07 ENCOUNTER — TELEPHONE (OUTPATIENT)
Age: 48
End: 2023-11-07

## 2023-11-07 ENCOUNTER — HOSPITAL ENCOUNTER (OUTPATIENT)
Facility: HOSPITAL | Age: 48
Setting detail: RECURRING SERIES
Discharge: HOME | End: 2023-11-10
Payer: COMMERCIAL

## 2023-11-07 PROCEDURE — 97112 NEUROMUSCULAR REEDUCATION: CPT

## 2023-11-07 PROCEDURE — 97110 THERAPEUTIC EXERCISES: CPT

## 2023-11-07 PROCEDURE — 97535 SELF CARE MNGMENT TRAINING: CPT

## 2023-11-07 PROCEDURE — 97530 THERAPEUTIC ACTIVITIES: CPT

## 2023-11-07 NOTE — PROGRESS NOTES
PHYSICAL / OCCUPATIONAL THERAPY - DAILY TREATMENT NOTE (updated )    Patient Name: Franchesca Galaviz    Date: 2023    : 1975  Insurance: Payor: Michael Robbins / Plan: Michael Robbins / Product Type: *No Product type* /      Patient  verified Yes     Visit #   Current / Total 3 10   Time   In / Out 10:10 10:54   Pain   In / Out 3 3   Subjective Functional Status/Changes: \"Nothing has changed since last visit. Pain is always there. \"     TREATMENT AREA =  Other low back pain [M54.59]    OBJECTIVE         Therapeutic Procedures: Tx Min Billable or 1:1 Min (if diff from Tx Min) Procedure, Rationale, Specifics   14  41487 Therapeutic Exercise (timed):  increase ROM, strength, coordination, balance, and proprioception to improve patient's ability to progress to PLOF and address remaining functional goals. (see flow sheet as applicable)     Details if applicable:       10  17710 Neuromuscular Re-Education (timed):  improve balance, coordination, kinesthetic sense, posture, core stability and proprioception to improve patient's ability to develop conscious control of individual muscles and awareness of position of extremities in order to progress to PLOF and address remaining functional goals. (see flow sheet as applicable)     Details if applicable:     10  66689 Therapeutic Activity (timed):  use of dynamic activities replicating functional movements to increase ROM, strength, coordination, balance, and proprioception in order to improve patient's ability to progress to PLOF and address remaining functional goals.   (see flow sheet as applicable)     Details if applicable:     10  27253 Self Care/Home Management (timed):  improve patient knowledge and understanding of pain reducing techniques, positioning, posture/ergonomics, home safety, activity modification, diagnosis/prognosis, and physical therapy expectations, procedures and progression  to improve patient's ability to progress to PLOF and

## 2023-11-07 NOTE — TELEPHONE ENCOUNTER
W/C patient    Santiago Hughes from [facility] is requesting work status letter and office notes from 11/2 visit faxed to her at [phone].

## 2023-11-09 ENCOUNTER — HOSPITAL ENCOUNTER (OUTPATIENT)
Facility: HOSPITAL | Age: 48
Setting detail: RECURRING SERIES
Discharge: HOME | End: 2023-11-12
Payer: COMMERCIAL

## 2023-11-09 PROCEDURE — 97110 THERAPEUTIC EXERCISES: CPT

## 2023-11-09 PROCEDURE — 97530 THERAPEUTIC ACTIVITIES: CPT

## 2023-11-09 PROCEDURE — 97112 NEUROMUSCULAR REEDUCATION: CPT

## 2023-11-09 PROCEDURE — 97535 SELF CARE MNGMENT TRAINING: CPT

## 2023-11-09 NOTE — PROGRESS NOTES
strength 5-/5 in order to increased ambulation distances in the community              PN Status: progressing left: 4+/5. Right: 5-/5 in both planes    Tolerating progression of exercises  4. Pt will report max pain 3/10 in order to increase participation in therapy              PN Status: No change, still 6/10 at worst but notes a decrease in frequency.     Progressing: Reports 3/10 max pain this week        Next PN/ RC due 11/23/2023                           Auth due 10 more     PLAN  - Continue Plan of Care  - Upgrade activities as tolerated    Elex Argos, PTA    11/9/2023    9:19 AM    Future Appointments   Date Time Provider [address]   11/9/2023 10:10 AM Elsa Lockett, PTA [facility]   11/14/2023 10:10 AM Nazia Florez, PTA [facility]   11/16/2023 10:10 AM Elsa Lockett, PTA [facility]   11/28/2023 10:10 AM Nazia Andresyle, PTA [facility]   11/30/2023 10:10 AM Rovicky Gackle, PTA [facility]   11/30/2023  4:30 PM Arcelia Pond MD [facility]

## 2023-11-14 ENCOUNTER — HOSPITAL ENCOUNTER (OUTPATIENT)
Facility: HOSPITAL | Age: 48
Setting detail: RECURRING SERIES
Discharge: HOME | End: 2023-11-17
Payer: COMMERCIAL

## 2023-11-14 PROCEDURE — 97112 NEUROMUSCULAR REEDUCATION: CPT

## 2023-11-14 PROCEDURE — 97535 SELF CARE MNGMENT TRAINING: CPT

## 2023-11-14 PROCEDURE — 97530 THERAPEUTIC ACTIVITIES: CPT

## 2023-11-14 NOTE — PROGRESS NOTES
[facility] - [address] [address] [place], [address] [facility]  [phone] fax    PROGRESS NOTE  Patient Name: Gail Miranda : 1975   Treatment/Medical Diagnosis: Other low back pain [M54.59]   Referral Source: Cristal Roberts*     Date of Initial Visit: 23 Attended Visits: 20 Missed Visits: 0     SUMMARY OF TREATMENT  Ms. Alec Boyer presents for 20th visit including IE with report of 80% overall improvement. She reports increasing standing and sitting tolerance of 2 hours each and consistently decreased max pain levels since SOC. Her FOTO score, while improved, continues to remain lower (60 points), indicating a lowered QOL. She has been able to complete functional lifting and carrying activities with 35# within the clinic but works in the [employer] and her work duties involve lifting 50#. CURRENT STATUS  1. Pt will report compliance with HEP in order to supplement PT treatment              PN Status: Continued compliance- update as appropriate   Current: patient reports compliance during weekdays   2. Pt will score at least 65 on FOTO in order to improve overall function, decrease pain, and facilitate return to PLOF. PN Status: No change 44 points               Current: progressing, 60 points   3. Pt will demonstrate bilateral hip abduction/extension strength 5-/5 in order to increased ambulation distances in the community              PN Status: progressing left: 4+/5. Right: 5-/5 in both planes               Current: progressing, 5-/5 B hip abduction, right hip ext: 4+/5, left hip ext: 5-/5   4. Pt will report max pain 3/10 in order to increase participation in therapy              PN Status: No change, still 6/10 at worst but notes a decrease in frequency.                Current:  MET, Reports 3/10 max pain          New Goals to be achieved in __8 treatments__        Pt will report compliance with discharge HEP in

## 2023-11-14 NOTE — PROGRESS NOTES
PHYSICAL / OCCUPATIONAL THERAPY - DAILY TREATMENT NOTE (updated )    Patient Name: Koby Perez    Date: 2023    : 1975  Insurance: Payor: Lizy Limb / Plan: [email or web address]imb / Product Type: *No Product type* /      Patient  verified Yes     Visit #   Current / Total 5 10   Time   In / Out 1011 1050   Pain   In / Out 3 2-3   Subjective Functional Status/Changes: Functional Gains: sitting tolerance has increased to 2 hours, standing tolerance of 2-3 hours  Functional Deficits: increased pain after prolonged sitting/standing (needs to be able to stand for 10 hours at work), bending over to lift/clean  % improvement: 80%  Pain   Average: -3/10       Best: -2/10     Worst: 3/10  Patient Goal: \"to increase my standing tolerance and be able to bend over without a pulling sensation\"       TREATMENT AREA =  Other low back pain [M54.59]    OBJECTIVE    Therapeutic Procedures: Tx Min Billable or 1:1 Min (if diff from Tx Min) Procedure, Rationale, Specifics   x  49156 Therapeutic Exercise (timed):  increase ROM, strength, coordination, balance, and proprioception to improve patient's ability to progress to PLOF and address remaining functional goals. (see flow sheet as applicable)     Details if applicable:       8  08284 Neuromuscular Re-Education (timed):  improve balance, coordination, kinesthetic sense, posture, core stability and proprioception to improve patient's ability to develop conscious control of individual muscles and awareness of position of extremities in order to progress to PLOF and address remaining functional goals.  (see flow sheet as applicable)     Details if applicable:  core, lumbopelvic, and scapular re-ed   23  22230 Therapeutic Activity (timed):  use of dynamic activities replicating functional movements to increase ROM, strength, coordination, balance, and proprioception in order to improve patient's ability to progress to PLOF and address remaining functional

## 2023-11-16 ENCOUNTER — HOSPITAL ENCOUNTER (OUTPATIENT)
Facility: HOSPITAL | Age: 48
Setting detail: RECURRING SERIES
Discharge: HOME | End: 2023-11-19
Payer: COMMERCIAL

## 2023-11-16 PROCEDURE — 97140 MANUAL THERAPY 1/> REGIONS: CPT

## 2023-11-16 PROCEDURE — 97110 THERAPEUTIC EXERCISES: CPT

## 2023-11-16 PROCEDURE — 97112 NEUROMUSCULAR REEDUCATION: CPT

## 2023-11-16 NOTE — PROGRESS NOTES
Patient will complete stoop and lift carry of 50# x 100' for improved ease of work duty completion.    PN status: patient completes farmers carries with 15/20# (total of 35#) for 100'      Next PN/ RC due 12/13/23                        Auth 28 visits total    [facility] activities as tolerated    Beth Hodgkin, PT    11/16/2023    8:39 AM    Future Appointments   Date Time Provider [address] [place] [address]   11/28/2023 10:10 AM Jonathan Perez PTA [facility]   11/30/2023 10:10 AM Jonathan Perez PTA [facility]   11/30/2023  4:30 PM Jodelle Merlin, MD [facility]

## 2023-11-28 ENCOUNTER — HOSPITAL ENCOUNTER (OUTPATIENT)
Facility: HOSPITAL | Age: 48
Setting detail: RECURRING SERIES
Discharge: HOME | End: 2023-12-01
Payer: COMMERCIAL

## 2023-11-28 PROCEDURE — 97112 NEUROMUSCULAR REEDUCATION: CPT

## 2023-11-28 PROCEDURE — 97530 THERAPEUTIC ACTIVITIES: CPT

## 2023-11-28 PROCEDURE — 97110 THERAPEUTIC EXERCISES: CPT

## 2023-11-28 PROCEDURE — 97140 MANUAL THERAPY 1/> REGIONS: CPT

## 2023-11-28 NOTE — PROGRESS NOTES
PHYSICAL / OCCUPATIONAL THERAPY - DAILY TREATMENT NOTE (updated )    Patient Name: Tamela Mendenhall    Date: 2023    : 1975  Insurance: Payor: Boni Rainey / Plan: Boni Rainey / Product Type: *No Product type* /      Patient  verified Yes     Visit #   Current / Total 2 8   Time   In / Out 1009 1051   Pain   In / Out 3 3   Subjective Functional Status/Changes: Patient reports that she did not self correct any pelvic obliquities due to her pain never rising above 3/10 since her last visit. TREATMENT AREA =  Other low back pain [M54.59]    OBJECTIVE    Therapeutic Procedures:  37475 Therapeutic Exercise (timed):  increase ROM, strength, coordination, balance, and proprioception to improve patient's ability to progress to PLOF and address remaining functional goals. Tx Min Billable or 1:1 Min   (if diff from Tx Min) Details:   12  See flow sheet as applicable     66543 Neuromuscular Re-Education (timed):  improve balance, coordination, kinesthetic sense, posture, core stability and proprioception to improve patient's ability to develop conscious control of individual muscles and awareness of position of extremities in order to progress to PLOF and address remaining functional goals. Tx Min Billable or 1:1 Min   (if diff from Tx Min) Details:   8  See flow sheet as applicable     21018 Manual Therapy (timed):  decrease pain, increase ROM, and increase tissue extensibility to improve patient's ability to progress to PLOF and address remaining functional goals. The manual therapy interventions were performed at a separate and distinct time from the therapeutic activities interventions .    Tx Min Billable or 1:1 Min   (if diff from Tx Min) Details:   8  MET to correct right anterior innominate rotation and patient re-education on self correction      12486 Therapeutic Activity (timed):  use of dynamic activities replicating functional movements to increase ROM, strength,

## 2023-11-30 ENCOUNTER — TELEMEDICINE (OUTPATIENT)
Age: 48
End: 2023-11-30
Payer: COMMERCIAL

## 2023-11-30 ENCOUNTER — HOSPITAL ENCOUNTER (OUTPATIENT)
Facility: HOSPITAL | Age: 48
Setting detail: RECURRING SERIES
End: 2023-11-30
Payer: COMMERCIAL

## 2023-11-30 DIAGNOSIS — M47.816 FACET ARTHRITIS OF LUMBAR REGION: ICD-10-CM

## 2023-11-30 DIAGNOSIS — M54.50 CHRONIC BILATERAL LOW BACK PAIN WITHOUT SCIATICA: Primary | ICD-10-CM

## 2023-11-30 DIAGNOSIS — G89.29 CHRONIC BILATERAL LOW BACK PAIN WITHOUT SCIATICA: Primary | ICD-10-CM

## 2023-11-30 PROCEDURE — 99441 PR PHYS/QHP TELEPHONE EVALUATION 5-10 MIN: CPT | Performed by: PHYSICAL MEDICINE & REHABILITATION

## 2023-11-30 PROCEDURE — 97110 THERAPEUTIC EXERCISES: CPT

## 2023-11-30 PROCEDURE — 97112 NEUROMUSCULAR REEDUCATION: CPT

## 2023-11-30 PROCEDURE — 97530 THERAPEUTIC ACTIVITIES: CPT

## 2023-11-30 ASSESSMENT — PATIENT HEALTH QUESTIONNAIRE - PHQ9
1. LITTLE INTEREST OR PLEASURE IN DOING THINGS: 0
SUM OF ALL RESPONSES TO PHQ QUESTIONS 1-9: 0
SUM OF ALL RESPONSES TO PHQ QUESTIONS 1-9: 0
2. FEELING DOWN, DEPRESSED OR HOPELESS: 0
SUM OF ALL RESPONSES TO PHQ QUESTIONS 1-9: 0
SUM OF ALL RESPONSES TO PHQ9 QUESTIONS 1 & 2: 0
SUM OF ALL RESPONSES TO PHQ QUESTIONS 1-9: 0

## 2023-11-30 NOTE — PROGRESS NOTES
Smitane Claude presents today for   Chief Complaint   Patient presents with    Back Pain       Is someone accompanying this pt? no    Is the patient using any DME equipment during OV? no    Depression Screening:       No data to display                Learning Assessment:  No flowsheet data found. Abuse Screening:       No data to display                Fall Risk  No flowsheet data found. OPIOID RISK TOOL  No flowsheet data found. Coordination of Care:  1. Have you been to the ER, urgent care clinic since your last visit? no  Hospitalized since your last visit? no    2. Have you seen or consulted any other health care providers outside of the [address] [place] [address] since your last visit? no Include any pap smears or colon screening.  no

## 2023-11-30 NOTE — PROGRESS NOTES
remaining functional goals. Tx Min Billable or 1:1 Min   (if diff from Tx Min) Details:   16  See flow sheet as applicable      45  MC BC Totals Reminder: bill using total billable min of TIMED therapeutic procedures (example: do not include dry needle or estim unattended, both untimed codes, in totals to left)  8-22 min = 1 unit; 23-37 min = 2 units; 38-52 min = 3 units; 53-67 min = 4 units; 68-82 min = 5 units   Total Total     [x]  Patient Education billed concurrently with other procedures   [x] Review HEP    [] Progressed/Changed HEP, detail:  [] Other detail:       Objective Information/Functional Measures/Assessment  Added foam to paloff presses and shoulder extensions with marching    No pelvic obliquities noted today; patient reports that she has self-corrected at home 2x since last visit. Good tolerance to increased loading and core stabilization challenges today; no adverse responses reported throughout. Plan to continue to increase loading barring any adverse responses to progress toward completion of functional goal #4. Patient will continue to benefit from skilled PT / OT services to modify and progress therapeutic interventions, analyze and address functional mobility deficits, analyze and address ROM deficits, analyze and address strength deficits, analyze and address soft tissue restrictions, analyze and cue for proper movement patterns, analyze and modify for postural abnormalities, analyze and address imbalance/dizziness, and instruct in home and community integration to address functional deficits and attain remaining goals. Progress toward goals / Updated goals:  []  See Progress Note/Recertification  New Goals to be achieved in __8 treatments__    Pt will report compliance with discharge HEP in order to supplement PT treatment              PN status: patient reports compliance during weekdays   Limited compliance over the holiday weekend   2.    Pt will score at least 65 on FOTO in order to improve overall function, decrease pain, and facilitate return to PLOF. PN status:  60 points   Assess at end of current POC   3. Pt will demonstrate right hip extension strength 5-/5 in order to increased ambulation distances in the community              PN status:  right hip ext: 4+/5  Tolerating progressions  4. Patient will complete stoop and lift carry of 50# x 100' for improved ease of work duty completion.    PN status: patient completes farmers carries with 15/20# (total of 35#) for 100'    good tolerance and good technique exhibited to progressions; increased loading today with plan to continue to progress load at [place]    Next PN/ RC due 12/13/23                        Auth 28 visits total    [facility] activities as tolerated    Karla Elias PTA    11/30/2023    7:49 AM    Future Appointments   Date Time Provider [address]   11/30/2023 10:10 AM Karla Elias PTA [address]   11/30/2023  4:30 PM Serge Lopez MD [facility]   12/5/2023 12:50 PM [facility] Galicia[facility] [address] [place] [address] [facility]   12/7/2023  2:10 PM [facility]ovanny Walsh, [facility] [address] [place] [address] [employer]   12/12/2023  2:10 PM Giovanny Walsh, [facility] [address] [place] [address] [employer]   12/14/2023  2:50 PM Giovanny Walsh, [facility] [address] [facility]   12/19/2023 12:50 PM [facility]anio Galicia [facility] [address] [facility]   12/21/2023 12:10 PM Giovanny Walsh, [facility] [address] [place] [address] ubigrate

## 2023-11-30 NOTE — PROGRESS NOTES
[facility]  [address] [facility], [address] [place] [address]   Phone: [phone]  Fax: [phone]      Patient: Nika Leonardo                                                                              MRN: [id number]        YOB: 1975          AGE: 46 y.o. PCP: ERICA Dominguez NP  Date:  11/30/23    Reason for Consultation: Back Pain      HPI:  Nika Leonardo is a 46 y.o. female with relevant PMH of HTN who presented with mid and low back pain which began after an while lifting at work 6/17/23. The following day felt back pain and saw her PCP 6/19/23 who got x-rays of her spine and had her rest from work and gave her muscle relaxants and NSAIDS. She returned to work 6/26/23 but pain persisted and she saw her pcp and was held out of work from 7/22/23. She is feeling a bit better and started PT 8/31/23- which helps a bit but she continues to have low back pain. MRI lumbar spine 10/12/2023 unremarkable aside from lower lumbar facet arthritis. She is still doing PT. She is is interested in trying an injection. Works at [employer]    Neurologic symptoms: No numbness, tingling, weakness, bowel or bladder changes. No recent falls      Location: The pain is located in the low back pain    Radiation: The pain does not radiate . Pain Score: Currently: 6/10    Quality: Pain is of a sharp, cramping, spasm quality. Aggravating: Pain is exacerbated by walking and standing  Alleviating: The pain is alleviated by lying down    Prior Treatments:  Physical therapy: Yes started 8/31/23  Injections:No  Surgery:No  Previous Medications: flexeril 10mg , robaxin,  Current Medications: ibuprofen 600mg   Previous work-up has included:   X-ray lumbar spine 6/19/2023-  DEGENERATIVE CHANGES:  Moderate degenerative changes dominant posterior  elements of the middle and lower lumbar spine. MRI Result (most recent):   MRI LUMBAR SPINE WO

## 2023-12-05 ENCOUNTER — HOSPITAL ENCOUNTER (OUTPATIENT)
Facility: HOSPITAL | Age: 48
Setting detail: RECURRING SERIES
Discharge: HOME | End: 2023-12-08
Payer: COMMERCIAL

## 2023-12-05 PROCEDURE — 97110 THERAPEUTIC EXERCISES: CPT

## 2023-12-05 PROCEDURE — 97112 NEUROMUSCULAR REEDUCATION: CPT

## 2023-12-05 PROCEDURE — 97140 MANUAL THERAPY 1/> REGIONS: CPT

## 2023-12-05 NOTE — PROGRESS NOTES
PHYSICAL / OCCUPATIONAL THERAPY - DAILY TREATMENT NOTE (updated )    Patient Name: Tomás Graham    Date: 2023    : 1975  Insurance: Payor: Shannan Rabago / Plan: Shannan Rabago / Product Type: *No Product type* /      Patient  verified Yes     Visit #   Current / Total 4 8   Time   In / Out 12:50 1:30   Pain   In / Out 6 3   Subjective Functional Status/Changes:    Pt reports having some more pain today due to lifting and prolonged walking. TREATMENT AREA =  Other low back pain [M54.59]    OBJECTIVE    Therapeutic Procedures: Tx Min Billable or 1:1 Min (if diff from Tx Min) Procedure, Rationale, Specifics   10  35262 Therapeutic Exercise (timed):  increase ROM, strength, coordination, balance, and proprioception to improve patient's ability to progress to PLOF and address remaining functional goals. (see flow sheet as applicable)     Details if applicable:       20  93483 Neuromuscular Re-Education (timed):  improve balance, coordination, kinesthetic sense, posture, core stability and proprioception to improve patient's ability to develop conscious control of individual muscles and awareness of position of extremities in order to progress to PLOF and address remaining functional goals. (see flow sheet as applicable)     Details if applicable:     10  93753 Manual Therapy (timed):  decrease pain, increase ROM, increase tissue extensibility, and increase postural awareness to improve patient's ability to progress to PLOF and address remaining functional goals. The manual therapy interventions were performed at a separate and distinct time from the therapeutic activities interventions . (see flow sheet as applicable)     Details if applicable:   In supine: pelvic alignment and leg length assessments, MET to correct right posterior/left anterior innominate, shotgun technique; in left s/l: MET to correct sacral torsion   40  MC BC Totals Reminder: bill using total billable min of TIMED

## 2023-12-07 ENCOUNTER — HOSPITAL ENCOUNTER (OUTPATIENT)
Facility: HOSPITAL | Age: 48
Setting detail: RECURRING SERIES
Discharge: HOME | End: 2023-12-10
Payer: COMMERCIAL

## 2023-12-07 PROCEDURE — 97140 MANUAL THERAPY 1/> REGIONS: CPT

## 2023-12-07 PROCEDURE — 97110 THERAPEUTIC EXERCISES: CPT

## 2023-12-07 PROCEDURE — 97530 THERAPEUTIC ACTIVITIES: CPT

## 2023-12-07 NOTE — PROGRESS NOTES
PHYSICAL / OCCUPATIONAL THERAPY - DAILY TREATMENT NOTE (updated )    Patient Name: Koby Perez    Date: 2023    : 1975  Insurance: Payor: Lizy Limb / Plan: [employer] / Product Type: *No Product type* /      Patient  verified Yes     Visit #   Current / Total 5 8   Time   In / Out 2:15 3:08   Pain   In / Out 3 2   Subjective Functional Status/Changes: Pt reporting decreased pain levels since last visit, \"I haven't been doing a lot today or yesterday\"     TREATMENT AREA =  Other low back pain [M54.59]    OBJECTIVE    Therapeutic Procedures: Tx Min Billable or 1:1 Min (if diff from Tx Min) Procedure, Rationale, Specifics   23  24263 Therapeutic Exercise (timed):  increase ROM, strength, coordination, balance, and proprioception to improve patient's ability to progress to PLOF and address remaining functional goals. (see flow sheet as applicable)     Details if applicable:       20  28923 Neuromuscular Re-Education (timed):  improve balance, coordination, kinesthetic sense, posture, core stability and proprioception to improve patient's ability to develop conscious control of individual muscles and awareness of position of extremities in order to progress to PLOF and address remaining functional goals. (see flow sheet as applicable)     Details if applicable:     10  79856 Manual Therapy (timed):  decrease pain, increase ROM, increase tissue extensibility, and increase postural awareness to improve patient's ability to progress to PLOF and address remaining functional goals. The manual therapy interventions were performed at a separate and distinct time from the therapeutic activities interventions . (see flow sheet as applicable)     Details if applicable: In supine: pelvic alignment and leg length assessments, MET to correct L posterior/R anterior innominate, shotgun technique.   STM to upper Lspine paraspinals   48  MC BC Totals Reminder: bill using total billable min of

## 2023-12-12 ENCOUNTER — TELEPHONE (OUTPATIENT)
Age: 48
End: 2023-12-12

## 2023-12-12 ENCOUNTER — HOSPITAL ENCOUNTER (OUTPATIENT)
Facility: HOSPITAL | Age: 48
Setting detail: RECURRING SERIES
Discharge: HOME | End: 2023-12-15
Payer: COMMERCIAL

## 2023-12-12 PROCEDURE — 97110 THERAPEUTIC EXERCISES: CPT

## 2023-12-12 PROCEDURE — 97530 THERAPEUTIC ACTIVITIES: CPT

## 2023-12-12 PROCEDURE — 97112 NEUROMUSCULAR REEDUCATION: CPT

## 2023-12-12 NOTE — PROGRESS NOTES
[facility] - [address] [place], [address] [facility]  [phone] fax    PROGRESS NOTE  Patient Name: Priya Cooper : 1975   Treatment/Medical Diagnosis: Other low back pain [M54.59]   Referral Source: Phill Priscilla*     Date of Initial Visit: 23 Attended Visits: 26 Missed Visits: 0     SUMMARY OF TREATMENT  Pt arrives with updated orders from MD for continued OPPT care for 6-8 weeks and orders to return to work as of 2023. Pt has not returned to work, she will benefit from further skilled care to continue to strengthen truncal muscles and B LE for increased support during dynamic work tasks such as heavy lifting and lifting heavy objects outside of [place], such as prolonged standing, lifting ac basket and stacking heavy boxes of product. CURRENT STATUS:  Objective Information/Functional Measures/Assessment  Goals assessed for PN:     Pain at best 3/10, at worst 6/10   Subjective % improvement 80% \"I don't feel like it will get better\"     Objective:  HIP MMT:   Flexion (R) 4- (L) 4-  ABD (R) 4+ (L) 4+  EXT (R) 4+ (L) 4+     Functional Improvements: Pt able to walk for an hour with slight increase in pain  Remaining functional deficits: prolonged standing and walking     FOTO: 67/100  HEP compliance: reports everyday compliance      Progress toward goals / Updated goals:  []  See Progress Note/Recertification  New Goals to be achieved in __8 treatments__    Pt will report compliance with discharge HEP in order to supplement PT treatment              PN status: patient reports compliance during weekdays   Updated to include right hip ER and left hip IR clams  Current PN: goal met, reports compliance everyday  2. Pt will score at least 65 on FOTO in order to improve overall function, decrease pain, and facilitate return to PLOF. PN status:  60 points   Current PN: goal met, 67/100  3.    Pt will demonstrate right hip

## 2023-12-12 NOTE — TELEPHONE ENCOUNTER
Call received from patients nurse  Urban Jacques requesting the patients current work status, and restrictions if any. Emma's fax # [phone],  phone # [phone].

## 2023-12-12 NOTE — PROGRESS NOTES
PHYSICAL / OCCUPATIONAL THERAPY - DAILY TREATMENT NOTE (updated )    Patient Name: Melanie Ziegler    Date: 2023    : 1975  Insurance: Payor: Ct Ibarra / Plan: Ct Ibarra / Product Type: *No Product type* /      Patient  verified Yes     Visit #   Current / Total 6 8   Time   In / Out 2:12 2:54   Pain   In / Out 3 3   Subjective Functional Status/Changes: Pain at best 3/10, at worst 6/10   Subjective % improvement 80%     TREATMENT AREA =  Other low back pain [M54.59]    OBJECTIVE    Therapeutic Procedures: Tx Min Billable or 1:1 Min (if diff from Tx Min) Procedure, Rationale, Specifics   12  01557 Therapeutic Exercise (timed):  increase ROM, strength, coordination, balance, and proprioception to improve patient's ability to progress to PLOF and address remaining functional goals. (see flow sheet as applicable)     Details if applicable:       12  72279 Neuromuscular Re-Education (timed):  improve balance, coordination, kinesthetic sense, posture, core stability and proprioception to improve patient's ability to develop conscious control of individual muscles and awareness of position of extremities in order to progress to PLOF and address remaining functional goals. (see flow sheet as applicable)     Details if applicable:     x  60111 Manual Therapy (timed):  decrease pain, increase ROM, increase tissue extensibility, and increase postural awareness to improve patient's ability to progress to PLOF and address remaining functional goals. The manual therapy interventions were performed at a separate and distinct time from the therapeutic activities interventions . (see flow sheet as applicable)     Details if applicable: In supine: pelvic alignment and leg length assessments, MET to correct L posterior/R anterior innominate, shotgun technique.   STM to upper Lspine paraspinals   18  57781 Therapeutic Activity (timed):  use of dynamic activities replicating functional movements

## 2023-12-12 NOTE — TELEPHONE ENCOUNTER
Per note written 11/30/2023  No restrictions return to work 12/3/23 hs to attend PT sessions       To Whom It May Concern: It is my medical opinion that Arlyn Ladd may return to work on 12/3/23 . She should continue to attend physical therapy sessions 1-2 x per week for the next 6-8 weeks. Each session lasts 30-60 minutes. If you have any questions or concerns, please don't hesitate to call.

## 2023-12-13 NOTE — TELEPHONE ENCOUNTER
The last letter done on 11/30/23 was printed and faxed to the number provided. It was made attention to [address] [place] [address]. A fax confirmation was received. I called [address] [place] [address] to make her aware and was not able to reach her. A message was left for her making her aware that the letter was faxed. She was asked to contact the office if this is not what she is looking for or has any questions.

## 2023-12-19 ENCOUNTER — TELEPHONE (OUTPATIENT)
Age: 48
End: 2023-12-19

## 2023-12-19 NOTE — TELEPHONE ENCOUNTER
Ana Garcia from [facility] faxed requesting if patient should return to light duty or full duty. Please update work note and fax to [phone].

## 2023-12-21 ENCOUNTER — HOSPITAL ENCOUNTER (OUTPATIENT)
Facility: HOSPITAL | Age: 48
Setting detail: RECURRING SERIES
Discharge: HOME | End: 2023-12-24
Payer: COMMERCIAL

## 2023-12-21 PROCEDURE — 97112 NEUROMUSCULAR REEDUCATION: CPT

## 2023-12-21 PROCEDURE — 97530 THERAPEUTIC ACTIVITIES: CPT

## 2023-12-21 PROCEDURE — 97535 SELF CARE MNGMENT TRAINING: CPT

## 2023-12-21 PROCEDURE — 97110 THERAPEUTIC EXERCISES: CPT

## 2023-12-21 NOTE — PROGRESS NOTES
Ita Cristian [facility]   1/5/2024  2:10 PM [facility] PT [facility] 1 [facility]   1/10/2024 10:50 AM Vito Ieshaly, PTA [facility]   1/12/2024  2:50 PM Lonnie Farrell, [place] [facility]   1/17/2024 10:50 AM Nico Tiwari, PT [facility]   1/19/2024  2:10 PM Nico Tiwari, PT [facility]   1/24/2024 10:50 AM Lonnie Farrell, [place] [facility]   1/26/2024  2:10 PM Vito Amaya, PTA [facility]   1/31/2024 10:50 AM Vito Ieshaly, PTA [facility]   2/21/2024 10:40 AM Jonatan Mcmanus MD [facility]

## 2023-12-22 ENCOUNTER — TELEPHONE (OUTPATIENT)
Age: 48
End: 2023-12-22

## 2023-12-22 NOTE — TELEPHONE ENCOUNTER
Ms Fredrick Bajwa is asking for the office note on 12/12/23 @ 5:23 pm be sent to her location FAX: [phone]

## 2024-01-03 ENCOUNTER — HOSPITAL ENCOUNTER (OUTPATIENT)
Facility: HOSPITAL | Age: 49
Setting detail: RECURRING SERIES
Discharge: HOME | End: 2024-01-06
Payer: COMMERCIAL

## 2024-01-03 ENCOUNTER — TELEPHONE (OUTPATIENT)
Age: 49
End: 2024-01-03

## 2024-01-03 PROCEDURE — 97530 THERAPEUTIC ACTIVITIES: CPT

## 2024-01-03 PROCEDURE — 97112 NEUROMUSCULAR REEDUCATION: CPT

## 2024-01-03 PROCEDURE — 97140 MANUAL THERAPY 1/> REGIONS: CPT

## 2024-01-03 PROCEDURE — 97110 THERAPEUTIC EXERCISES: CPT

## 2024-01-03 NOTE — PROGRESS NOTES
(example: do not include dry needle or estim unattended, both untimed codes, in totals to left)  8-22 min = 1 unit; 23-37 min = 2 units; 38-52 min = 3 units; 53-67 min = 4 units; 68-82 min = 5 units   Total Total     TOTAL TREATMENT TIME:   56     [x]  Patient Education billed concurrently with other procedures   [x] Review HEP    [] Progressed/Changed HEP, detail:  [] Other detail:       Objective Information/Functional Measures/Assessment  Pt arrives with mod reports of increased pain since last visit, Pt has returned to work since this time.  She reports a max pain of 10/10 through out thoracic and lumbar region with reports of muscle spasms after the second shift.   Pain decreasing while 2' KENNY and completion of CP to thoracic and lumbar region of back.  Today's session focusing on spinal stretching and mobility to decrease recent mod increase of pain since return to work.  Assess her response to today's session and upcoming work shift at [employer].     Patient will continue to benefit from skilled PT / OT services to modify and progress therapeutic interventions, analyze and address functional mobility deficits, analyze and address ROM deficits, analyze and address strength deficits, analyze and address soft tissue restrictions, analyze and cue for proper movement patterns, analyze and modify for postural abnormalities, analyze and address imbalance/dizziness, and instruct in home and community integration to address functional deficits and attain remaining goals.    Progress toward goals / Updated goals:  []  See Progress Note/Recertification  New Goals to be achieved in __10__    1. Pt will demonstrate right hip extension strength 5-/5 in order to increased ambulation distances in the community  PN: EXT (Right) 4+ (Left) 4+  2. Patient will complete stoop and lift carry of 50# x 100' for improved ease of work duty completion.   PN: completes squat and lift to wait height with 48.5# x5 times with no pain reported,

## 2024-01-03 NOTE — TELEPHONE ENCOUNTER
Patient called and said that she has been having a lot  of pain and muscle spasms. That she has been having it since 12/28/23.    Patient is asking if  could prescribe her some pain medication, and some medication for her muscle spasm.    Patient said her PCP had prescribed her some Cyclobenzaprine 10 mg medication before. She believes that medication is for muscle spasms.    [facility] pharmacy on [facility]   Tel. [phone]    Patient tel. [phone].

## 2024-01-05 ENCOUNTER — HOSPITAL ENCOUNTER (OUTPATIENT)
Facility: HOSPITAL | Age: 49
Setting detail: RECURRING SERIES
Discharge: HOME | End: 2024-01-08
Payer: COMMERCIAL

## 2024-01-05 PROCEDURE — 97112 NEUROMUSCULAR REEDUCATION: CPT

## 2024-01-05 PROCEDURE — 97110 THERAPEUTIC EXERCISES: CPT

## 2024-01-05 PROCEDURE — 97530 THERAPEUTIC ACTIVITIES: CPT

## 2024-01-05 NOTE — PROGRESS NOTES
PHYSICAL / OCCUPATIONAL THERAPY - DAILY TREATMENT NOTE (updated )    Patient Name: Amirah Balbuena    Date: 2024    : 1975  Insurance: Payor: JANNA SAEED / Plan: JANNA SAEED / Product Type: *No Product type* /      Patient  verified Yes     Visit #   Current / Total 5 10   Time   In / Out 2:09 2:48   Pain   In / Out 8 7   Subjective Functional Status/Changes: Pt notes that she has been experiencing an increase in pain and other symptoms since returning to her work which she describes as \"being on her feet all day\".     TREATMENT AREA =  Other low back pain [M54.59]  Modalities Rationale:     decrease inflammation and decrease pain to improve patient's ability to progress to PLOF and address remaining functional goals.     min [] Estim Unattended, type/location:                                      []  w/ice    []  w/heat    min [] Estim Attended, type/location:                                     []  w/US     []  w/ice    []  w/heat    []  TENS insruct      min []  Mechanical Traction: type/lbs                   []  pro   []  sup   []  int   []  cont    []  before manual    []  after manual    min []  Ultrasound, settings/location:     PD min  unbill [x]  Ice     []  Heat    location/position: CP to Lspine in supine    min []  Paraffin,  details:     min []  Vasopneumatic Device, press/temp:     min []  Whirlpool / Fluido:    If using vaso (only need to measure limb vaso being performed on)      pre-treatment girth :       post-treatment girth :       measured at (landmark location) :      min []  Other:    Skin assessment post-treatment:   Intact       OBJECTIVE:  Therapeutic Procedures:  Tx Min Billable or 1:1 Min (if diff from Tx Min) Procedure, Rationale, Specifics   12  21709 Therapeutic Exercise (timed):  increase ROM, strength, coordination, balance, and proprioception to improve patient's ability to progress to PLOF and address remaining functional goals. (see flow sheet as

## 2024-01-08 NOTE — TELEPHONE ENCOUNTER
Would she like me to renew the flexeril for spasm?  Unfortunately I do not prescribe any stronger pain medication aside from anti-inflammatories

## 2024-01-10 ENCOUNTER — HOSPITAL ENCOUNTER (OUTPATIENT)
Facility: HOSPITAL | Age: 49
Setting detail: RECURRING SERIES
Discharge: HOME | End: 2024-01-13
Payer: COMMERCIAL

## 2024-01-10 PROCEDURE — 97140 MANUAL THERAPY 1/> REGIONS: CPT

## 2024-01-10 PROCEDURE — 97110 THERAPEUTIC EXERCISES: CPT

## 2024-01-10 PROCEDURE — 97112 NEUROMUSCULAR REEDUCATION: CPT

## 2024-01-10 PROCEDURE — 97530 THERAPEUTIC ACTIVITIES: CPT

## 2024-01-10 NOTE — PROGRESS NOTES
PHYSICAL / OCCUPATIONAL THERAPY - DAILY TREATMENT NOTE (updated )    Patient Name: Amirah Balbuena    Date: 1/10/2024    : 1975  Insurance: Payor: JANNA SAEED / Plan: JANNA SAEED / Product Type: *No Product type* /      Patient  verified Yes     Visit #   Current / Total 6 10   Time   In / Out 1050 1131   Pain   In / Out 6 6   Subjective Functional Status/Changes: Pain at best 6/10, at worst 8-9/10  Hard to sit down at work, \"clicking to the R bottom part\", bending over increasing pain, prolonged standing/walking  Extension based exercises decreasing pain  Subjective % improvement 50-60% after return to work     TREATMENT AREA =  Other low back pain [M54.59]  Modalities Rationale:     decrease inflammation and decrease pain to improve patient's ability to progress to PLOF and address remaining functional goals.     min [] Estim Unattended, type/location:                                      []  w/ice    []  w/heat    min [] Estim Attended, type/location:                                     []  w/US     []  w/ice    []  w/heat    []  TENS insruct      min []  Mechanical Traction: type/lbs                   []  pro   []  sup   []  int   []  cont    []  before manual    []  after manual    min []  Ultrasound, settings/location:     PD min  unbill [x]  Ice     []  Heat    location/position: CP to Lspine in supine    min []  Paraffin,  details:     min []  Vasopneumatic Device, press/temp:     min []  Whirlpool / Fluido:    If using vaso (only need to measure limb vaso being performed on)      pre-treatment girth :       post-treatment girth :       measured at (landmark location) :      min []  Other:    Skin assessment post-treatment:   Intact       OBJECTIVE:  Therapeutic Procedures:  Tx Min Billable or 1:1 Min (if diff from Tx Min) Procedure, Rationale, Specifics   11  23449 Therapeutic Exercise (timed):  increase ROM, strength, coordination, balance, and proprioception to improve patient's

## 2024-01-10 NOTE — PROGRESS NOTES
[facility] - [address]  [address]  [place] [address]  [phone] fax    PROGRESS NOTE  Patient Name: Amirah Balbuena : 1975   Treatment/Medical Diagnosis: Other low back pain [M54.59]   Referral Source: Conor Novak*     Date of Initial Visit: 2023 Attended Visits: 32 Missed Visits: 0     SUMMARY OF TREATMENT  Pt making slow progress towards remaining and new goal since returning to work 2023.      Pt demonstrating signs and symptoms of disc involvement with increased reports of pain completing trunk flexion activities and decreasing pain levels in trunk extension - she would possibly benefit from further imagining for investigation of disc involvement, no reports of negative or positive disc involvement in latest MRI, oct 2023.      She will benefit from further skilled therapy to increase B LE strength and correct lifting mechanics for decreased pain levels at work, as recent visits have been focusing on decreasing pain levels since return to work.  Plan to complete one further period of OPPT to address remaining deficits then D/C to at home maintenance if Pt demonstrates lack of progress.          CURRENT STATUS  Goals assessed for PN:  Pain at best 6/10, at worst 8-9/10  Hard to sit down at work, \"clicking to the R bottom part\", bending over increasing pain, prolonged standing/walking  Extension based exercises decreasing pain  Subjective % improvement 50-60% after return to work     Objective:   HIP MMT:   Flexion (R) 4 (L) 4  ABD (R) 5/5 (L) 5/5  EXT (R) 4+ (L) 4+     Lift and carry with 45# = poor form initially, able to correct with VC/visual cueing     Functional Improvements: decreased muscle spasms since return to work, mm spasms starting after return to work  Remaining functional deficits: most work related duties, lifting, prolonged standing/walking     FOTO: NT/100  HEP compliance: everyday compliance, and stretching

## 2024-01-10 NOTE — TELEPHONE ENCOUNTER
Spoke to patient, she verified her name and , she no longer needs the refill because she still had refill on another pill bottle. She will let us know when she needs more.  Also verified her appointment with Dr. Feng.  No further action is needed for this message.

## 2024-01-12 ENCOUNTER — HOSPITAL ENCOUNTER (OUTPATIENT)
Facility: HOSPITAL | Age: 49
Setting detail: RECURRING SERIES
Discharge: HOME | End: 2024-01-15
Payer: COMMERCIAL

## 2024-01-12 PROCEDURE — 97112 NEUROMUSCULAR REEDUCATION: CPT

## 2024-01-12 PROCEDURE — 97110 THERAPEUTIC EXERCISES: CPT

## 2024-01-12 PROCEDURE — 97530 THERAPEUTIC ACTIVITIES: CPT

## 2024-01-12 PROCEDURE — 97535 SELF CARE MNGMENT TRAINING: CPT

## 2024-01-12 NOTE — PROGRESS NOTES
PHYSICAL / OCCUPATIONAL THERAPY - DAILY TREATMENT NOTE    Patient Name: Amirah Balbuena    Date: 2024    : 1975  Insurance: Payor: JANNA SAEED / Plan: JANNA SAEED / Product Type: *No Product type* /      Patient  verified Yes     Visit #   Current / Total 1 8   Time   In / Out 250 325   Pain   In / Out 6 4   Subjective Functional Status/Changes: Pt stating that she's been working on her core more during lifting.      TREATMENT AREA =  Other low back pain [M54.59]    OBJECTIVE         Therapeutic Procedures:    Tx Min Billable or 1:1 Min (if diff from Tx Min) Procedure, Rationale, Specifics   8  57443 Therapeutic Exercise (timed):  increase ROM, strength, coordination, balance, and proprioception to improve patient's ability to progress to PLOF and address remaining functional goals. (see flow sheet as applicable)     Details if applicable:       97112 Neuromuscular Re-Education (timed):  improve balance, coordination, kinesthetic sense, posture, core stability and proprioception to improve patient's ability to develop conscious control of individual muscles and awareness of position of extremities in order to progress to PLOF and address remaining functional goals. (see flow sheet as applicable)     Details if applicable:     97530 Therapeutic Activity (timed):  use of dynamic activities replicating functional movements to increase ROM, strength, coordination, balance, and proprioception in order to improve patient's ability to progress to PLOF and address remaining functional goals.  (see flow sheet as applicable)     Details if applicable:     97535 Self Care/Home Management (timed):  improve patient knowledge and understanding of pain reducing techniques, positioning, and posture/ergonomics  to improve patient's ability to progress to PLOF and address remaining functional goals.  (see flow sheet as applicable)     Details if applicable:     35 35 [facility] Totals Reminder:

## 2024-01-17 ENCOUNTER — HOSPITAL ENCOUNTER (OUTPATIENT)
Facility: HOSPITAL | Age: 49
Discharge: HOME | End: 2024-01-20

## 2024-01-17 ENCOUNTER — HOSPITAL ENCOUNTER (OUTPATIENT)
Facility: HOSPITAL | Age: 49
Setting detail: RECURRING SERIES
Discharge: HOME | End: 2024-01-20
Payer: COMMERCIAL

## 2024-01-17 LAB — LABCORP SPECIMEN COLLECTION: NORMAL

## 2024-01-17 PROCEDURE — 97535 SELF CARE MNGMENT TRAINING: CPT

## 2024-01-17 PROCEDURE — 97112 NEUROMUSCULAR REEDUCATION: CPT

## 2024-01-17 PROCEDURE — 97110 THERAPEUTIC EXERCISES: CPT

## 2024-01-17 PROCEDURE — 97530 THERAPEUTIC ACTIVITIES: CPT

## 2024-01-19 ENCOUNTER — HOSPITAL ENCOUNTER (OUTPATIENT)
Facility: HOSPITAL | Age: 49
Setting detail: RECURRING SERIES
Discharge: HOME | End: 2024-01-22
Payer: COMMERCIAL

## 2024-01-19 PROCEDURE — 97530 THERAPEUTIC ACTIVITIES: CPT

## 2024-01-19 PROCEDURE — 97110 THERAPEUTIC EXERCISES: CPT

## 2024-01-19 PROCEDURE — 97112 NEUROMUSCULAR REEDUCATION: CPT

## 2024-01-19 PROCEDURE — 97535 SELF CARE MNGMENT TRAINING: CPT

## 2024-01-19 NOTE — PROGRESS NOTES
PHYSICAL / OCCUPATIONAL THERAPY - DAILY TREATMENT NOTE    Patient Name: Amirah Balbuena    Date: 2024    : 1975  Insurance: Payor: JANNA SAEED / Plan: JANNA SAEED / Product Type: *No Product type* /      Patient  verified Yes     Visit #   Current / Total 3 10   Time   In / Out 2:12 3:10   Pain   In / Out 4-5 4   Subjective Functional Status/Changes: Feeling good, feeling clicking in my back     TREATMENT AREA =  Other low back pain [M54.59]    OBJECTIVE         Therapeutic Procedures:    Tx Min Billable or 1:1 Min (if diff from Tx Min) Procedure, Rationale, Specifics   22  19976 Therapeutic Exercise (timed):  increase ROM, strength, coordination, balance, and proprioception to improve patient's ability to progress to PLOF and address remaining functional goals. (see flow sheet as applicable)     Details if applicable:       12  51809 Neuromuscular Re-Education (timed):  improve balance, coordination, kinesthetic sense, posture, core stability and proprioception to improve patient's ability to develop conscious control of individual muscles and awareness of position of extremities in order to progress to PLOF and address remaining functional goals. (see flow sheet as applicable)     Details if applicable:     12  81666 Therapeutic Activity (timed):  use of dynamic activities replicating functional movements to increase ROM, strength, coordination, balance, and proprioception in order to improve patient's ability to progress to PLOF and address remaining functional goals.  (see flow sheet as applicable)     Details if applicable:     12  37635 Manual Therapy (timed):  decrease pain, increase ROM, increase tissue extensibility, decrease trigger points, and increase postural awareness to improve patient's ability to progress to PLOF and address remaining functional goals.  The manual therapy interventions were performed at a separate and distinct time from the therapeutic activities

## 2024-01-24 ENCOUNTER — HOSPITAL ENCOUNTER (OUTPATIENT)
Facility: HOSPITAL | Age: 49
Setting detail: RECURRING SERIES
Discharge: HOME | End: 2024-01-27
Payer: COMMERCIAL

## 2024-01-24 PROCEDURE — 97110 THERAPEUTIC EXERCISES: CPT

## 2024-01-24 PROCEDURE — 97530 THERAPEUTIC ACTIVITIES: CPT

## 2024-01-24 PROCEDURE — 97112 NEUROMUSCULAR REEDUCATION: CPT

## 2024-01-24 NOTE — PROGRESS NOTES
PN/ RC due 02/08/24  Auth due (visit number/ date) 9 more visits    PLAN  - Continue Plan of Care    Neal Seymour, PT    1/24/2024    10:59 AM    Future Appointments   Date Time Provider Department Center   1/26/2024  2:10 PM Manisha Galeana, PTA [facility]   1/31/2024 10:50 AM Orville Luz, PT [facility]   2/21/2024 10:40 AM Griselda Feng MD [facility]

## 2024-01-26 ENCOUNTER — HOSPITAL ENCOUNTER (OUTPATIENT)
Facility: HOSPITAL | Age: 49
Setting detail: RECURRING SERIES
Discharge: HOME | End: 2024-01-29
Payer: COMMERCIAL

## 2024-01-26 PROCEDURE — 97112 NEUROMUSCULAR REEDUCATION: CPT

## 2024-01-26 PROCEDURE — 97535 SELF CARE MNGMENT TRAINING: CPT

## 2024-01-26 PROCEDURE — 97530 THERAPEUTIC ACTIVITIES: CPT

## 2024-01-26 PROCEDURE — 97110 THERAPEUTIC EXERCISES: CPT

## 2024-01-26 NOTE — PROGRESS NOTES
PHYSICAL / OCCUPATIONAL THERAPY - DAILY TREATMENT NOTE    Patient Name: Amirah Balbuena    Date: 2024    : 1975  Insurance: Payor: JANNA SAEED / Plan: JANNA SAEED / Product Type: *No Product type* /      Patient  verified Yes     Visit #   Current / Total 5 10   Time   In / Out 1049 1133   Pain   In / Out 4 0   Subjective Functional Status/Changes: \"I was able to  a case of water yesterday without any pain.\"     TREATMENT AREA =  Other low back pain [M54.59]    OBJECTIVE         Therapeutic Procedures:    Tx Min Billable or 1:1 Min (if diff from Tx Min) Procedure, Rationale, Specifics   10  88499 Therapeutic Exercise (timed):  increase ROM, strength, coordination, balance, and proprioception to improve patient's ability to progress to PLOF and address remaining functional goals. (see flow sheet as applicable)     Details if applicable:       16  07272 Neuromuscular Re-Education (timed):  improve balance, coordination, kinesthetic sense, posture, core stability and proprioception to improve patient's ability to develop conscious control of individual muscles and awareness of position of extremities in order to progress to PLOF and address remaining functional goals. (see flow sheet as applicable)     Details if applicable:     10  81023 Therapeutic Activity (timed):  use of dynamic activities replicating functional movements to increase ROM, strength, coordination, balance, and proprioception in order to improve patient's ability to progress to PLOF and address remaining functional goals.  (see flow sheet as applicable)     Details if applicable:     8  95605 Self Care/Home Management (timed):  improve patient knowledge and understanding of pain reducing techniques, positioning, posture/ergonomics, home safety, activity modification, diagnosis/prognosis, and physical therapy expectations, procedures and progression  to improve patient's ability to progress to PLOF and address 
Good

## 2024-01-31 ENCOUNTER — HOSPITAL ENCOUNTER (OUTPATIENT)
Facility: HOSPITAL | Age: 49
Setting detail: RECURRING SERIES
Discharge: HOME | End: 2024-02-03
Payer: COMMERCIAL

## 2024-01-31 PROCEDURE — 97112 NEUROMUSCULAR REEDUCATION: CPT

## 2024-01-31 PROCEDURE — 97535 SELF CARE MNGMENT TRAINING: CPT

## 2024-01-31 PROCEDURE — 97530 THERAPEUTIC ACTIVITIES: CPT

## 2024-01-31 PROCEDURE — 97110 THERAPEUTIC EXERCISES: CPT

## 2024-01-31 NOTE — PROGRESS NOTES
PHYSICAL / OCCUPATIONAL THERAPY - DAILY TREATMENT NOTE    Patient Name: Amirah Balbuena    Date: 2024    : 1975  Insurance: Payor: JANNA SAEED / Plan: JANNA SAEED / Product Type: *No Product type* /      Patient  verified Yes     Visit #   Current / Total 6 10   Time   In / Out 1049 1126   Pain   In / Out 6 5-6   Subjective Functional Status/Changes: \"I don't know what I did, but I'm hurting a little more today.\"     TREATMENT AREA =  Other low back pain [M54.59]    OBJECTIVE         Therapeutic Procedures:    Tx Min Billable or 1:1 Min (if diff from Tx Min) Procedure, Rationale, Specifics   10  13211 Therapeutic Exercise (timed):  increase ROM, strength, coordination, balance, and proprioception to improve patient's ability to progress to PLOF and address remaining functional goals. (see flow sheet as applicable)     Details if applicable:       8  55506 Neuromuscular Re-Education (timed):  improve balance, coordination, kinesthetic sense, posture, core stability and proprioception to improve patient's ability to develop conscious control of individual muscles and awareness of position of extremities in order to progress to PLOF and address remaining functional goals. (see flow sheet as applicable)     Details if applicable:     9  32727 Therapeutic Activity (timed):  use of dynamic activities replicating functional movements to increase ROM, strength, coordination, balance, and proprioception in order to improve patient's ability to progress to PLOF and address remaining functional goals.  (see flow sheet as applicable)     Details if applicable:     10  39862 Self Care/Home Management (timed):  improve patient knowledge and understanding of pain reducing techniques, positioning, posture/ergonomics, home safety, activity modification, diagnosis/prognosis, and physical therapy expectations, procedures and progression  to improve patient's ability to progress to PLOF and address remaining

## 2024-01-31 NOTE — PROGRESS NOTES
Physical Therapy Discharge Instructions      [facility] [address]  [address]  [place] [address]  [phone] fax      Patient: Amirah Balbuena  : 1975      Continue Home Exercise Program 1-2 times per day       Continue with    [x] Ice  as needed     [x] Heat           Follow up with MD:     [x] Upon completion of therapy     [] As needed      Recommendations:     [x]   Return to activity with home program    []   Return to activity with the following modifications:       []Post Rehab Program    []Join Independent aquatic program     [x]Return to/join local gym      Orville Luz PTA, CSCS 2024 11:11 AM

## 2024-01-31 NOTE — PROGRESS NOTES
[facility] [address]  [address]  [place] [address]  [phone] fax    DISCHARGE SUMMARY  Patient Name: Amirah Balbuena : 1975   Treatment/Medical Diagnosis: Other low back pain [M54.59]   Referral Source: Conor Novak*     Date of Initial Visit: 2024 Attended Visits: 37 Missed Visits: 0     SUMMARY OF TREATMENT  Ms. Balbuena reports 60% improvement since beginning PT. Pt demonstrates an increase hip/glut strength. She was able to lift 50+ lbs and carry > 100 ft in clinic today without pain. She reports that she has returned to work full time, but continues to have back pain of up to 8/10 by the end of the day. Reports that she still has difficulty with repetitive lifting and walking/standing long periods. She does report that overall ADL performance and mobility have increased. We will discharge to an updated HEP at this time.    CURRENT STATUS  Pt will demonstrate right hip extension strength 5-/5 in order to increased ambulation distances in the community        Current PN: no progress, 4+/5   MET; 5/5 bilaterally  Patient will complete stoop and lift carry of 50# x 100' for improved ease of work duty completion.  Current PN: progressing, demonstrating poor lifting mechanics during today's visit with 15# KB, VC for head/hip relation  MET; 51# x 100' in clinic without pain  3.   Pt will complete a full day of work with a max pain level of 2/10 for an increased QOL.              Last PN: Pt has not returned to work - max pain level without work /10              Current PN: no progress, Pt reports pain levels up to 8-9/10 after a full day at work.              NOT MET; reports completing full day of work, but with up to 8/10 at worst     Functional Gains: working, lifting, reaching, working full day, decreased pain, body/postural awareness, mobility  Functional Deficits: pain, repetitive lifting, pushing, long periods of

## 2024-02-15 ENCOUNTER — TELEPHONE (OUTPATIENT)
Age: 49
End: 2024-02-15

## 2024-02-15 NOTE — TELEPHONE ENCOUNTER
Nurse  Fredrick Garcia is requesting a work note faxed to [phone]. She would like a note with clarification from the note written on 11/30/23. She requested the note states if there are restrictions or if the patient is full duty.

## 2024-02-16 NOTE — TELEPHONE ENCOUNTER
The new updated work note was printed and faxed over to Fredrick Garcia. A fax confirmation was received.

## 2024-02-21 ENCOUNTER — OFFICE VISIT (OUTPATIENT)
Age: 49
End: 2024-02-21

## 2024-02-21 VITALS
TEMPERATURE: 98.2 F | WEIGHT: 211 LBS | OXYGEN SATURATION: 100 % | HEIGHT: 65 IN | RESPIRATION RATE: 18 BRPM | HEART RATE: 76 BPM | BODY MASS INDEX: 35.16 KG/M2

## 2024-02-21 DIAGNOSIS — M47.816 LUMBAR FACET ARTHROPATHY: ICD-10-CM

## 2024-02-21 DIAGNOSIS — S39.012D STRAIN OF LUMBAR REGION, SUBSEQUENT ENCOUNTER: ICD-10-CM

## 2024-02-21 DIAGNOSIS — M54.59 MECHANICAL LOW BACK PAIN: Primary | ICD-10-CM

## 2024-02-21 PROBLEM — S39.012A STRAIN OF LUMBAR REGION: Status: ACTIVE | Noted: 2024-02-21

## 2024-02-21 RX ORDER — CETIRIZINE HYDROCHLORIDE 10 MG/1
10 TABLET ORAL DAILY
COMMUNITY
Start: 2024-02-07

## 2024-02-21 RX ORDER — HYDROCHLOROTHIAZIDE 12.5 MG/1
12.5 CAPSULE, GELATIN COATED ORAL DAILY
COMMUNITY
Start: 2024-02-07

## 2024-02-21 RX ORDER — CYCLOBENZAPRINE HCL 10 MG
10 TABLET ORAL EVERY 8 HOURS PRN
COMMUNITY
Start: 2024-02-07

## 2024-02-21 NOTE — PROGRESS NOTES
Amirah Balbuena presents today for   Chief Complaint   Patient presents with    Back Problem    Pain    Back Pain       Is someone accompanying this pt? no    Is the patient using any DME equipment during OV? no    Depression Screening:       No data to display                Learning Assessment:  Failed to redirect to the Timeline version of the Endpoint Clinical SmartLink.    Abuse Screening:       No data to display                Fall Risk  Failed to redirect to the Timeline version of the Endpoint Clinical SmartLink.    OPIOID RISK TOOL  Failed to redirect to the Timeline version of the Endpoint Clinical SmartLink.    Coordination of Care:  1. Have you been to the ER, urgent care clinic since your last visit? no  Hospitalized since your last visit? no    2. Have you seen or consulted any other health care providers outside of the [facility] since your last visit? no Include any pap smears or colon screening. no

## 2024-02-21 NOTE — PROGRESS NOTES
[facility]  [address] [facility], [address]  [place] [address]  Phone: [phone]  Fax: [phone]        Amirah Balbuena  : 1975  PCP: Alondra Guerrero APRN - NP    RFA EVALUATION      ASSESSMENT AND PLAN    Amirah was seen today for back problem, pain and back pain.    Diagnoses and all orders for this visit:    Mechanical low back pain  -     AMB POC XRAY, SPINE, LUMBOSACRAL; 4+    Strain of lumbar region, subsequent encounter    Lumbar facet arthropathy         Amirah Balbuena is a 48 y.o. female  with a work related lifting injury about 8 months ago.  She has recovered well and has a focal left upper lumbar trigger point.  Reassured patient that the right low back clicking in the morning is nothing concerning.  Facet arthropathy noted on imaging.  No motion on flexion-extension views today.  After discussion, patient feels that she is managing at this time.  Continue home exercise and as needed ibuprofen.  I would be happy to see her again to evaluate for injections if she desires.        Follow-up and Dispositions    Return for fu D. B in about 2 months. No fu w/me.            HISTORY OF PRESENT ILLNESS    Amirah Balbuena is seen today in consultation for RFA. Patient was referred by Dr. Novak. Notes will be sent to referring provider and PCP NP Alondra Guerrero.    Reviewed notes from Dr. Novak's office.    Patient reports Hx of muscle spasms s/p work injury (2023). She denies Hx of back pain before injury. She describes her symptoms have improved some since her injury.     Patient presents with right-sided low back clicking and left-sided mid back tenderness. She describes clicking in her low back when she gets out of bed in the morning or performs certain movements. Patient reports pain when sitting for approximately an hour. She denies numbness or tingling in her legs.    Patient admits to adhering to PT guided HEP

## 2024-03-19 ENCOUNTER — TELEPHONE (OUTPATIENT)
Age: 49
End: 2024-03-19

## 2024-03-19 NOTE — TELEPHONE ENCOUNTER
Patient's NCM, Yamile, called to request a copy of the work note on 2/16/24 be faxed to her.    Fax: [phone]

## 2025-02-27 ENCOUNTER — HOSPITAL ENCOUNTER (OUTPATIENT)
Facility: HOSPITAL | Age: 50
Setting detail: SPECIMEN
Discharge: HOME | End: 2025-03-02

## 2025-02-27 LAB — LABCORP SPECIMEN COLLECTION: NORMAL

## 2025-02-27 PROCEDURE — 99001 SPECIMEN HANDLING PT-LAB: CPT
